# Patient Record
Sex: FEMALE | Race: BLACK OR AFRICAN AMERICAN | NOT HISPANIC OR LATINO | URBAN - METROPOLITAN AREA
[De-identification: names, ages, dates, MRNs, and addresses within clinical notes are randomized per-mention and may not be internally consistent; named-entity substitution may affect disease eponyms.]

---

## 2022-08-11 ENCOUNTER — EMERGENCY (EMERGENCY)
Facility: HOSPITAL | Age: 37
LOS: 1 days | Discharge: HOME | End: 2022-08-11
Attending: EMERGENCY MEDICINE | Admitting: EMERGENCY MEDICINE

## 2022-08-11 ENCOUNTER — TRANSCRIPTION ENCOUNTER (OUTPATIENT)
Age: 37
End: 2022-08-11

## 2022-08-11 VITALS
TEMPERATURE: 98 F | SYSTOLIC BLOOD PRESSURE: 125 MMHG | RESPIRATION RATE: 18 BRPM | DIASTOLIC BLOOD PRESSURE: 73 MMHG | HEART RATE: 98 BPM | OXYGEN SATURATION: 99 %

## 2022-08-11 PROCEDURE — 99285 EMERGENCY DEPT VISIT HI MDM: CPT

## 2022-08-11 NOTE — ED ADULT NURSE NOTE - NSIMPLEMENTINTERV_GEN_ALL_ED
Implemented All Universal Safety Interventions:  [place] to call system. Call bell, personal items and telephone within reach. Instruct patient to call for assistance. Room bathroom lighting operational. Non-slip footwear when patient is off stretcher. Physically safe environment: no spills, clutter or unnecessary equipment. Stretcher in lowest position, wheels locked, appropriate side rails in place.

## 2022-08-11 NOTE — ED ADULT NURSE NOTE - NS ED NURSE NOTE DISPO AOU DETAILS FT
Pt DC from ED. Brought to L&D via wheelchair by PCA Warren. Pt A&Ox4, ambulatory. Breathing even and unlabored. NAD

## 2022-08-11 NOTE — ED ADULT NURSE NOTE - OBJECTIVE STATEMENT
Jacqueline RN. Patient A&Ox4 ambulatory c/o lower abdominal pressure, b/l flank pain x1 day. Also endorsing urinary frequency x2 weeks. Today started having vaginal bleeding, unsure of LMP due to hx PCOS. Patient uncomfortable. Respirations even and unlabored. Abdomen firm, distended and tender on palpation to suprapubic region. Patient denies cp, sob, n/v/d, fevers/chills. 20G IV placed to left antecubital. Awaiting further orders at this time. Stretcher in lowest position, wheels locked, appropriate side rails in place, call bell in reach.

## 2022-08-11 NOTE — ED ADULT TRIAGE NOTE - CHIEF COMPLAINT QUOTE
Pt arrives with 2 weeks of urinary frequency, now today with bladder pressure, back pain and scant hematuria. Tearful and moaning in pain. Denies fevers, chills, dizziness, blood thinners. PMHx HTN.

## 2022-08-12 ENCOUNTER — RESULT REVIEW (OUTPATIENT)
Age: 37
End: 2022-08-12

## 2022-08-12 ENCOUNTER — APPOINTMENT (OUTPATIENT)
Dept: ANTEPARTUM | Facility: CLINIC | Age: 37
End: 2022-08-12

## 2022-08-12 ENCOUNTER — INPATIENT (INPATIENT)
Facility: HOSPITAL | Age: 37
LOS: 3 days | Discharge: HOME | End: 2022-08-16
Attending: SPECIALIST | Admitting: SPECIALIST

## 2022-08-12 VITALS
SYSTOLIC BLOOD PRESSURE: 111 MMHG | DIASTOLIC BLOOD PRESSURE: 74 MMHG | RESPIRATION RATE: 16 BRPM | OXYGEN SATURATION: 100 % | HEART RATE: 92 BPM | TEMPERATURE: 99 F

## 2022-08-12 VITALS — TEMPERATURE: 98 F | SYSTOLIC BLOOD PRESSURE: 117 MMHG | DIASTOLIC BLOOD PRESSURE: 75 MMHG | HEART RATE: 103 BPM

## 2022-08-12 DIAGNOSIS — O60.00 PRETERM LABOR WITHOUT DELIVERY, UNSPECIFIED TRIMESTER: ICD-10-CM

## 2022-08-12 DIAGNOSIS — O26.899 OTHER SPECIFIED PREGNANCY RELATED CONDITIONS, UNSPECIFIED TRIMESTER: ICD-10-CM

## 2022-08-12 DIAGNOSIS — Z3A.00 WEEKS OF GESTATION OF PREGNANCY NOT SPECIFIED: ICD-10-CM

## 2022-08-12 LAB
AMPHET UR-MCNC: POSITIVE
ANION GAP SERPL CALC-SCNC: 15 MMOL/L — HIGH (ref 7–14)
APPEARANCE UR: ABNORMAL
APTT BLD: 27.6 SEC — SIGNIFICANT CHANGE UP (ref 27–36.3)
BACTERIA # UR AUTO: ABNORMAL
BARBITURATES UR SCN-MCNC: NEGATIVE — SIGNIFICANT CHANGE UP
BASOPHILS # BLD AUTO: 0.03 K/UL — SIGNIFICANT CHANGE UP (ref 0–0.2)
BASOPHILS NFR BLD AUTO: 0.2 % — SIGNIFICANT CHANGE UP (ref 0–2)
BENZODIAZ UR-MCNC: NEGATIVE — SIGNIFICANT CHANGE UP
BILIRUB UR-MCNC: ABNORMAL
BLD GP AB SCN SERPL QL: NEGATIVE — SIGNIFICANT CHANGE UP
BUN SERPL-MCNC: 5 MG/DL — LOW (ref 7–23)
CALCIUM SERPL-MCNC: 9.3 MG/DL — SIGNIFICANT CHANGE UP (ref 8.4–10.5)
CHLORIDE SERPL-SCNC: 101 MMOL/L — SIGNIFICANT CHANGE UP (ref 98–107)
CO2 SERPL-SCNC: 19 MMOL/L — LOW (ref 22–31)
COCAINE METAB.OTHER UR-MCNC: NEGATIVE — SIGNIFICANT CHANGE UP
COLOR SPEC: ABNORMAL
COVID-19 SPIKE DOMAIN AB INTERP: POSITIVE
COVID-19 SPIKE DOMAIN ANTIBODY RESULT: 12.2 U/ML — HIGH
CREAT SERPL-MCNC: 0.6 MG/DL — SIGNIFICANT CHANGE UP (ref 0.5–1.3)
CREATININE URINE RESULT, DAU: 253 MG/DL — SIGNIFICANT CHANGE UP
DIFF PNL FLD: ABNORMAL
EGFR: 118 ML/MIN/1.73M2 — SIGNIFICANT CHANGE UP
EOSINOPHIL # BLD AUTO: 0.07 K/UL — SIGNIFICANT CHANGE UP (ref 0–0.5)
EOSINOPHIL NFR BLD AUTO: 0.4 % — SIGNIFICANT CHANGE UP (ref 0–6)
EPI CELLS # UR: 1 /HPF — SIGNIFICANT CHANGE UP (ref 0–5)
FIBRINOGEN PPP-MCNC: 793 MG/DL — HIGH (ref 330–520)
GLUCOSE SERPL-MCNC: 86 MG/DL — SIGNIFICANT CHANGE UP (ref 70–99)
GLUCOSE UR QL: NEGATIVE — SIGNIFICANT CHANGE UP
HBV SURFACE AG SERPL QL IA: SIGNIFICANT CHANGE UP
HCG SERPL-ACNC: 4423 MIU/ML — SIGNIFICANT CHANGE UP
HCT VFR BLD CALC: 28.9 % — LOW (ref 34.5–45)
HGB BLD-MCNC: 9.3 G/DL — LOW (ref 11.5–15.5)
HIV 1+2 AB+HIV1 P24 AG SERPL QL IA: SIGNIFICANT CHANGE UP
HYALINE CASTS # UR AUTO: 0 /LPF — SIGNIFICANT CHANGE UP (ref 0–7)
IANC: 14.24 K/UL — HIGH (ref 1.8–7.4)
IMM GRANULOCYTES NFR BLD AUTO: 0.5 % — SIGNIFICANT CHANGE UP (ref 0–1.5)
KETONES UR-MCNC: ABNORMAL
LEUKOCYTE ESTERASE UR-ACNC: ABNORMAL
LYMPHOCYTES # BLD AUTO: 1.4 K/UL — SIGNIFICANT CHANGE UP (ref 1–3.3)
LYMPHOCYTES # BLD AUTO: 8.3 % — LOW (ref 13–44)
MAGNESIUM SERPL-MCNC: 1.7 MG/DL — SIGNIFICANT CHANGE UP (ref 1.6–2.6)
MAGNESIUM SERPL-MCNC: 4 MG/DL — HIGH (ref 1.6–2.6)
MCHC RBC-ENTMCNC: 25.8 PG — LOW (ref 27–34)
MCHC RBC-ENTMCNC: 32.2 GM/DL — SIGNIFICANT CHANGE UP (ref 32–36)
MCV RBC AUTO: 80.1 FL — SIGNIFICANT CHANGE UP (ref 80–100)
METHADONE UR-MCNC: NEGATIVE — SIGNIFICANT CHANGE UP
MONOCYTES # BLD AUTO: 1 K/UL — HIGH (ref 0–0.9)
MONOCYTES NFR BLD AUTO: 5.9 % — SIGNIFICANT CHANGE UP (ref 2–14)
NEUTROPHILS # BLD AUTO: 14.24 K/UL — HIGH (ref 1.8–7.4)
NEUTROPHILS NFR BLD AUTO: 84.7 % — HIGH (ref 43–77)
NITRITE UR-MCNC: POSITIVE
NRBC # BLD: 0 /100 WBCS — SIGNIFICANT CHANGE UP
NRBC # FLD: 0 K/UL — SIGNIFICANT CHANGE UP
OPIATES UR-MCNC: NEGATIVE — SIGNIFICANT CHANGE UP
OXYCODONE UR-MCNC: NEGATIVE — SIGNIFICANT CHANGE UP
PCP SPEC-MCNC: SIGNIFICANT CHANGE UP
PCP UR-MCNC: NEGATIVE — SIGNIFICANT CHANGE UP
PH UR: 6 — SIGNIFICANT CHANGE UP (ref 5–8)
PHOSPHATE SERPL-MCNC: 2.9 MG/DL — SIGNIFICANT CHANGE UP (ref 2.5–4.5)
PLATELET # BLD AUTO: 323 K/UL — SIGNIFICANT CHANGE UP (ref 150–400)
POTASSIUM SERPL-MCNC: 3.4 MMOL/L — LOW (ref 3.5–5.3)
POTASSIUM SERPL-SCNC: 3.4 MMOL/L — LOW (ref 3.5–5.3)
PROT UR-MCNC: ABNORMAL
RBC # BLD: 3.61 M/UL — LOW (ref 3.8–5.2)
RBC # FLD: 14.7 % — HIGH (ref 10.3–14.5)
RBC CASTS # UR COMP ASSIST: 267 /HPF — HIGH (ref 0–4)
RH IG SCN BLD-IMP: POSITIVE — SIGNIFICANT CHANGE UP
RH IG SCN BLD-IMP: POSITIVE — SIGNIFICANT CHANGE UP
SARS-COV-2 IGG+IGM SERPL QL IA: 12.2 U/ML — HIGH
SARS-COV-2 IGG+IGM SERPL QL IA: POSITIVE
SARS-COV-2 RNA SPEC QL NAA+PROBE: SIGNIFICANT CHANGE UP
SODIUM SERPL-SCNC: 135 MMOL/L — SIGNIFICANT CHANGE UP (ref 135–145)
SP GR SPEC: 1.02 — SIGNIFICANT CHANGE UP (ref 1–1.05)
THC UR QL: NEGATIVE — SIGNIFICANT CHANGE UP
UROBILINOGEN FLD QL: ABNORMAL
WBC # BLD: 16.82 K/UL — HIGH (ref 3.8–10.5)
WBC # FLD AUTO: 16.82 K/UL — HIGH (ref 3.8–10.5)
WBC UR QL: 129 /HPF — HIGH (ref 0–5)

## 2022-08-12 PROCEDURE — 59514 CESAREAN DELIVERY ONLY: CPT | Mod: U7,UB,GC

## 2022-08-12 PROCEDURE — 88307 TISSUE EXAM BY PATHOLOGIST: CPT | Mod: 26

## 2022-08-12 PROCEDURE — 99222 1ST HOSP IP/OBS MODERATE 55: CPT

## 2022-08-12 PROCEDURE — 76817 TRANSVAGINAL US OBSTETRIC: CPT | Mod: 26

## 2022-08-12 DEVICE — INTERCEED 3 X 4": Type: IMPLANTABLE DEVICE | Status: FUNCTIONAL

## 2022-08-12 DEVICE — SURGICEL SNOW 2 X 4": Type: IMPLANTABLE DEVICE | Status: FUNCTIONAL

## 2022-08-12 RX ORDER — OXYCODONE HYDROCHLORIDE 5 MG/1
5 TABLET ORAL ONCE
Refills: 0 | Status: DISCONTINUED | OUTPATIENT
Start: 2022-08-12 | End: 2022-08-16

## 2022-08-12 RX ORDER — AMPICILLIN TRIHYDRATE 250 MG
2 CAPSULE ORAL ONCE
Refills: 0 | Status: COMPLETED | OUTPATIENT
Start: 2022-08-12 | End: 2022-08-12

## 2022-08-12 RX ORDER — MAGNESIUM SULFATE 500 MG/ML
2 VIAL (ML) INJECTION
Qty: 40 | Refills: 0 | Status: DISCONTINUED | OUTPATIENT
Start: 2022-08-12 | End: 2022-08-12

## 2022-08-12 RX ORDER — OXYTOCIN 10 UNIT/ML
333.33 VIAL (ML) INJECTION
Qty: 20 | Refills: 0 | Status: DISCONTINUED | OUTPATIENT
Start: 2022-08-12 | End: 2022-08-13

## 2022-08-12 RX ORDER — BUTORPHANOL TARTRATE 2 MG/ML
0.12 INJECTION, SOLUTION INTRAMUSCULAR; INTRAVENOUS EVERY 6 HOURS
Refills: 0 | Status: DISCONTINUED | OUTPATIENT
Start: 2022-08-12 | End: 2022-08-12

## 2022-08-12 RX ORDER — MAGNESIUM SULFATE 500 MG/ML
4 VIAL (ML) INJECTION ONCE
Refills: 0 | Status: COMPLETED | OUTPATIENT
Start: 2022-08-12 | End: 2022-08-12

## 2022-08-12 RX ORDER — SODIUM CHLORIDE 9 MG/ML
1000 INJECTION, SOLUTION INTRAVENOUS
Refills: 0 | Status: DISCONTINUED | OUTPATIENT
Start: 2022-08-12 | End: 2022-08-16

## 2022-08-12 RX ORDER — SODIUM CHLORIDE 9 MG/ML
500 INJECTION, SOLUTION INTRAVENOUS ONCE
Refills: 0 | Status: DISCONTINUED | OUTPATIENT
Start: 2022-08-12 | End: 2022-08-12

## 2022-08-12 RX ORDER — AMPICILLIN TRIHYDRATE 250 MG
CAPSULE ORAL
Refills: 0 | Status: DISCONTINUED | OUTPATIENT
Start: 2022-08-12 | End: 2022-08-12

## 2022-08-12 RX ORDER — LANOLIN
1 OINTMENT (GRAM) TOPICAL EVERY 6 HOURS
Refills: 0 | Status: DISCONTINUED | OUTPATIENT
Start: 2022-08-12 | End: 2022-08-16

## 2022-08-12 RX ORDER — ONDANSETRON 8 MG/1
4 TABLET, FILM COATED ORAL EVERY 6 HOURS
Refills: 0 | Status: DISCONTINUED | OUTPATIENT
Start: 2022-08-12 | End: 2022-08-16

## 2022-08-12 RX ORDER — MAGNESIUM SULFATE 500 MG/ML
4 VIAL (ML) INJECTION ONCE
Refills: 0 | Status: DISCONTINUED | OUTPATIENT
Start: 2022-08-12 | End: 2022-08-12

## 2022-08-12 RX ORDER — SODIUM CHLORIDE 9 MG/ML
1000 INJECTION, SOLUTION INTRAVENOUS ONCE
Refills: 0 | Status: DISCONTINUED | OUTPATIENT
Start: 2022-08-12 | End: 2022-08-12

## 2022-08-12 RX ORDER — ACETAMINOPHEN 500 MG
650 TABLET ORAL ONCE
Refills: 0 | Status: COMPLETED | OUTPATIENT
Start: 2022-08-12 | End: 2022-08-12

## 2022-08-12 RX ORDER — CEFTRIAXONE 500 MG/1
INJECTION, POWDER, FOR SOLUTION INTRAMUSCULAR; INTRAVENOUS
Refills: 0 | Status: DISCONTINUED | OUTPATIENT
Start: 2022-08-12 | End: 2022-08-12

## 2022-08-12 RX ORDER — SIMETHICONE 80 MG/1
80 TABLET, CHEWABLE ORAL EVERY 4 HOURS
Refills: 0 | Status: DISCONTINUED | OUTPATIENT
Start: 2022-08-12 | End: 2022-08-16

## 2022-08-12 RX ORDER — ERTAPENEM SODIUM 1 G/1
1000 INJECTION, POWDER, LYOPHILIZED, FOR SOLUTION INTRAMUSCULAR; INTRAVENOUS ONCE
Refills: 0 | Status: COMPLETED | OUTPATIENT
Start: 2022-08-12 | End: 2022-08-12

## 2022-08-12 RX ORDER — KETOROLAC TROMETHAMINE 30 MG/ML
15 SYRINGE (ML) INJECTION ONCE
Refills: 0 | Status: DISCONTINUED | OUTPATIENT
Start: 2022-08-12 | End: 2022-08-12

## 2022-08-12 RX ORDER — NIFEDIPINE 30 MG
30 TABLET, EXTENDED RELEASE 24 HR ORAL ONCE
Refills: 0 | Status: COMPLETED | OUTPATIENT
Start: 2022-08-12 | End: 2022-08-12

## 2022-08-12 RX ORDER — CEFTRIAXONE 500 MG/1
1000 INJECTION, POWDER, FOR SOLUTION INTRAMUSCULAR; INTRAVENOUS ONCE
Refills: 0 | Status: COMPLETED | OUTPATIENT
Start: 2022-08-12 | End: 2022-08-12

## 2022-08-12 RX ORDER — IBUPROFEN 200 MG
600 TABLET ORAL EVERY 6 HOURS
Refills: 0 | Status: COMPLETED | OUTPATIENT
Start: 2022-08-12 | End: 2023-07-11

## 2022-08-12 RX ORDER — KETOROLAC TROMETHAMINE 30 MG/ML
30 SYRINGE (ML) INJECTION EVERY 6 HOURS
Refills: 0 | Status: DISCONTINUED | OUTPATIENT
Start: 2022-08-12 | End: 2022-08-13

## 2022-08-12 RX ORDER — MAGNESIUM SULFATE 500 MG/ML
2 VIAL (ML) INJECTION
Qty: 40 | Refills: 0 | Status: DISCONTINUED | OUTPATIENT
Start: 2022-08-12 | End: 2022-08-16

## 2022-08-12 RX ORDER — DEXAMETHASONE 0.5 MG/5ML
4 ELIXIR ORAL EVERY 6 HOURS
Refills: 0 | Status: DISCONTINUED | OUTPATIENT
Start: 2022-08-12 | End: 2022-08-16

## 2022-08-12 RX ORDER — TETANUS TOXOID, REDUCED DIPHTHERIA TOXOID AND ACELLULAR PERTUSSIS VACCINE, ADSORBED 5; 2.5; 8; 8; 2.5 [IU]/.5ML; [IU]/.5ML; UG/.5ML; UG/.5ML; UG/.5ML
0.5 SUSPENSION INTRAMUSCULAR ONCE
Refills: 0 | Status: COMPLETED | OUTPATIENT
Start: 2022-08-12

## 2022-08-12 RX ORDER — NALOXONE HYDROCHLORIDE 4 MG/.1ML
0.1 SPRAY NASAL
Refills: 0 | Status: DISCONTINUED | OUTPATIENT
Start: 2022-08-12 | End: 2022-08-16

## 2022-08-12 RX ORDER — SODIUM CHLORIDE 9 MG/ML
1000 INJECTION, SOLUTION INTRAVENOUS
Refills: 0 | Status: DISCONTINUED | OUTPATIENT
Start: 2022-08-12 | End: 2022-08-12

## 2022-08-12 RX ORDER — NIFEDIPINE 30 MG
10 TABLET, EXTENDED RELEASE 24 HR ORAL ONCE
Refills: 0 | Status: COMPLETED | OUTPATIENT
Start: 2022-08-12 | End: 2022-08-12

## 2022-08-12 RX ORDER — DIPHENHYDRAMINE HCL 50 MG
25 CAPSULE ORAL EVERY 6 HOURS
Refills: 0 | Status: DISCONTINUED | OUTPATIENT
Start: 2022-08-12 | End: 2022-08-16

## 2022-08-12 RX ORDER — ACETAMINOPHEN 500 MG
975 TABLET ORAL
Refills: 0 | Status: DISCONTINUED | OUTPATIENT
Start: 2022-08-12 | End: 2022-08-16

## 2022-08-12 RX ORDER — HEPARIN SODIUM 5000 [USP'U]/ML
5000 INJECTION INTRAVENOUS; SUBCUTANEOUS EVERY 12 HOURS
Refills: 0 | Status: DISCONTINUED | OUTPATIENT
Start: 2022-08-12 | End: 2022-08-16

## 2022-08-12 RX ORDER — MAGNESIUM HYDROXIDE 400 MG/1
30 TABLET, CHEWABLE ORAL
Refills: 0 | Status: DISCONTINUED | OUTPATIENT
Start: 2022-08-12 | End: 2022-08-16

## 2022-08-12 RX ORDER — LABETALOL HCL 100 MG
100 TABLET ORAL
Refills: 0 | Status: DISCONTINUED | OUTPATIENT
Start: 2022-08-12 | End: 2022-08-15

## 2022-08-12 RX ORDER — MORPHINE SULFATE 50 MG/1
0.1 CAPSULE, EXTENDED RELEASE ORAL ONCE
Refills: 0 | Status: DISCONTINUED | OUTPATIENT
Start: 2022-08-12 | End: 2022-08-16

## 2022-08-12 RX ORDER — OXYCODONE HYDROCHLORIDE 5 MG/1
5 TABLET ORAL
Refills: 0 | Status: DISCONTINUED | OUTPATIENT
Start: 2022-08-12 | End: 2022-08-16

## 2022-08-12 RX ORDER — AMPICILLIN TRIHYDRATE 250 MG
1 CAPSULE ORAL EVERY 4 HOURS
Refills: 0 | Status: DISCONTINUED | OUTPATIENT
Start: 2022-08-12 | End: 2022-08-12

## 2022-08-12 RX ORDER — CEFTRIAXONE 500 MG/1
1000 INJECTION, POWDER, FOR SOLUTION INTRAMUSCULAR; INTRAVENOUS EVERY 24 HOURS
Refills: 0 | Status: CANCELLED | OUTPATIENT
Start: 2022-08-13 | End: 2022-08-12

## 2022-08-12 RX ADMIN — Medication 10 MILLIGRAM(S): at 05:58

## 2022-08-12 RX ADMIN — Medication 108 GRAM(S): at 14:00

## 2022-08-12 RX ADMIN — ERTAPENEM SODIUM 120 MILLIGRAM(S): 1 INJECTION, POWDER, LYOPHILIZED, FOR SOLUTION INTRAMUSCULAR; INTRAVENOUS at 19:36

## 2022-08-12 RX ADMIN — Medication 300 GRAM(S): at 05:43

## 2022-08-12 RX ADMIN — ONDANSETRON 4 MILLIGRAM(S): 8 TABLET, FILM COATED ORAL at 21:56

## 2022-08-12 RX ADMIN — Medication 30 MILLIGRAM(S): at 05:58

## 2022-08-12 RX ADMIN — Medication 1000 MILLIUNIT(S)/MIN: at 19:28

## 2022-08-12 RX ADMIN — CEFTRIAXONE 100 MILLIGRAM(S): 500 INJECTION, POWDER, FOR SOLUTION INTRAMUSCULAR; INTRAVENOUS at 06:11

## 2022-08-12 RX ADMIN — HEPARIN SODIUM 5000 UNIT(S): 5000 INJECTION INTRAVENOUS; SUBCUTANEOUS at 23:33

## 2022-08-12 RX ADMIN — Medication 650 MILLIGRAM(S): at 01:07

## 2022-08-12 RX ADMIN — Medication 12 MILLIGRAM(S): at 05:34

## 2022-08-12 RX ADMIN — Medication 50 GM/HR: at 06:03

## 2022-08-12 RX ADMIN — Medication 216 GRAM(S): at 05:53

## 2022-08-12 RX ADMIN — Medication 108 GRAM(S): at 10:03

## 2022-08-12 NOTE — OB PROVIDER LABOR PROGRESS NOTE - NS_SUBJECTIVE/OBJECTIVE_OBGYN_ALL_OB_FT
Patient was assessed at bedside.  Patient stated she started having contraction pain every 3 minutes for th elast hour; denies leaking fluid/vaginal bleeding, reports + fetal movements.   Magnesium sulfate is in progress for neuroprotection  Procardia XL 30 for chronic HTN/tocolysis   Betamethasone 1st dose administered at 5 am  Rocephin is for UTI  Ampicillin for unknown GBS, cultures are pending  Patient is NPO secondary to breech presentation and advanced cervical exam , was advised to continue p  Patient was assessed by MFM, during rounds (unable to review the report at this time)  Vital Signs:  T(C): 37.2 (12 Aug 2022 14:01), Max: 37.2 (12 Aug 2022 14:01)  T(F): 98.96 (12 Aug 2022 14:01), Max: 98.96 (12 Aug 2022 14:01)  HR: 109 (12 Aug 2022 16:02) (92 - 150)  BP: 125/66 (12 Aug 2022 16:02) (99/59 - 139/57)  RR: 19 (12 Aug 2022 06:20) (16 - 19)  SpO2: 100% (12 Aug 2022 16:02) (96% - 100%)    O2 Parameters below as of 12 Aug 2022 04:41  Patient On (Oxygen Delivery Method): room air                          9.3    16.82 )-----------( 323      ( 12 Aug 2022 00:40 )             28.9   08-12    135  |  101  |  5<L>  ----------------------------<  86  3.4<L>   |  19<L>  |  0.60    Ca    9.3      12 Aug 2022 00:40  Phos  2.9     08-12  Mg     4.00     08-12    Urinalysis Basic - ( 12 Aug 2022 00:40 )    Color: Dark Orange / Appearance: Slightly Turbid / S.022 / pH: x  Gluc: x / Ketone: Large  / Bili: Small / Urobili: 6 mg/dL   Blood: x / Protein: Trace / Nitrite: Positive   Leuk Esterase: Large / RBC: 267 /HPF /  /HPF   Sq Epi: x / Non Sq Epi: 1 /HPF / Bacteria: Occasional Patient was assessed at bedside du eto nonreactive NST.  Patient stated she started having contraction pain every 3 minutes for the last hour; denies leaking fluid/vaginal bleeding, reports + fetal movements.   Magnesium sulfate is in progress for neuroprotection  Procardia XL 30 for chronic HTN/tocolysis   Betamethasone 1st dose administered at 5 am  Rocephin is for UTI  Ampicillin for unknown GBS, cultures are pending  Patient is NPO secondary to breech presentation and advanced cervical exam   Patient was assessed by MFM, during rounds (unable to review the report at this time)  Vital Signs:  T(C): 37.2 (12 Aug 2022 14:01), Max: 37.2 (12 Aug 2022 14:01)  T(F): 98.96 (12 Aug 2022 14:01), Max: 98.96 (12 Aug 2022 14:01)  HR: 109 (12 Aug 2022 16:02) (92 - 150)  BP: 125/66 (12 Aug 2022 16:02) (99/59 - 139/57)  RR: 19 (12 Aug 2022 06:20) (16 - 19)  SpO2: 100% (12 Aug 2022 16:02) (96% - 100%)    O2 Parameters below as of 12 Aug 2022 04:41  Patient On (Oxygen Delivery Method): room air                          9.3    16.82 )-----------( 323      ( 12 Aug 2022 00:40 )             28.9   08-12    135  |  101  |  5<L>  ----------------------------<  86  3.4<L>   |  19<L>  |  0.60    Ca    9.3      12 Aug 2022 00:40  Phos  2.9     08-12  Mg     4.00     08-12    Urinalysis Basic - ( 12 Aug 2022 00:40 )    Color: Dark Orange / Appearance: Slightly Turbid / S.022 / pH: x  Gluc: x / Ketone: Large  / Bili: Small / Urobili: 6 mg/dL   Blood: x / Protein: Trace / Nitrite: Positive   Leuk Esterase: Large / RBC: 267 /HPF /  /HPF   Sq Epi: x / Non Sq Epi: 1 /HPF / Bacteria: Occasional

## 2022-08-12 NOTE — CONSULT NOTE ADULT - ASSESSMENT
35 yo  at 26w2d with newly diagnosed pregnancy, UTI admitted for  labor with double footling breech presentation.     Recommendations:   -Given footling breech presentation, if progressing in  labor patient would require delivery via . This was discussed with her and patient amenable to this plan. Patient consented for  delivery with the high chance of classical  delivery discussed.   -Tocolysis stopped this morning due to concerns for possible placental abruption and/or infection as etiology of  labor   -Patient received one dose of betamethasone for fetal lung maturity and was evaluated by NICU team   -Continue magnesium for fetal neuroprotection for 12 hours   -Recommend continued treatment for UTI   -On initial evaluation the patient's UDS resulted positive for amphetamine, she denies any h/o drug use or dietary supplements (other than "vinegar pills"). Unclear why this was initially collected as patient denies history of drug use and she exhibits no signs clinically of intoxication. Recommend repeat UDS   -Patient has support person who will be covering if she needs to proceed with delivery. Emotional support provided to patient and her questions were answered   -Recommend continuation of patient's antihypertensive medication. At this time patient is normotensive without signs/symptoms of preeclampsia   -Dispo: patient to remain in PACU given high likelihood of  labor/delivery for observation, anticipate will likely deliver in next 1-2 days     Patient seen and d/w Dr. Rose Mary Wallace Comfort PGY-5  35 yo  at 26w2d with newly diagnosed pregnancy, UTI admitted for  labor with double footling breech presentation.     Recommendations:   -Given footling breech presentation, if progressing in  labor patient would require delivery via . This was discussed with her and patient amenable to this plan. Patient consented for  delivery with the high chance of classical  delivery discussed.   -Tocolysis stopped this morning due to concerns for possible placental abruption and/or infection as etiology of  labor   -Patient received one dose of betamethasone for fetal lung maturity and was evaluated by NICU team   -Continue magnesium for fetal neuroprotection for 12 hours   -Recommend continued treatment for UTI   -On initial evaluation the patient's UDS resulted positive for amphetamine, she denies any h/o drug use or dietary supplements (other than "vinegar pills"). Unclear why this was initially collected as patient denies history of drug use and she exhibits no signs clinically of intoxication. Of note, labetalol can rarely cause falsely elevated amphetamine on UDS. Recommend repeat UDS   -Patient has support person who will be covering if she needs to proceed with delivery. Emotional support provided to patient and her questions were answered   -Recommend continuation of patient's antihypertensive medication. At this time patient is normotensive without signs/symptoms of preeclampsia   -Dispo: patient to remain in PACU given high likelihood of  labor/delivery for observation, anticipate will likely deliver in next 1-2 days     Patient seen and d/w Dr. Rose Mary Wallace Comfort PGY-5

## 2022-08-12 NOTE — OB RN INTRAOPERATIVE NOTE - NSSELHIDDEN_OBGYN_ALL_OB_FT
[NS_DeliveryAttending1_OBGYN_ALL_OB_FT:[id number]],[NS_DeliveryAssist1_OBGYN_ALL_OB_FT:[id number]],[NS_DeliveryRN_OBGYN_ALL_OB_FT:[id number]] [NS_DeliveryAttending1_OBGYN_ALL_OB_FT:[id number]],[NS_DeliveryAssist1_OBGYN_ALL_OB_FT:[id number]],[NS_DeliveryRN_OBGYN_ALL_OB_FT:[id number]],[NS_DeliveryAttending2_OBGYN_ALL_OB_FT:[id number]],[NS_DeliveryAssist2_OBGYN_ALL_OB_FT:[id number]]

## 2022-08-12 NOTE — CONSULT NOTE ADULT - SUBJECTIVE AND OBJECTIVE BOX
MFM Initial Consult Note    **incomplete note**    HPI: The patient is a 37 yo  at ____     History:   OB: G1, h/o infertility  Gyn: irreg menses, +h/o PCOS, -f/c, +remote h/o chlamydia (treated), +h/o abnl Paps s/p procedure (not excisional procedure, possible D&C?)   PMH: PCOS, class II obesity, chronic hypertension  M Initial Consult Note    **incomplete note**     HPI: The patient is a 35 yo  at 26w2d dated by bedside ultrasound (unknown LMP, patient thinks possibly sometime in January, patient did not previously know she was pregnant) who was found to be 5cm dilated after presenting for one day of abdominal pain. Patient reports two weeks of mild abdominal discomfort/pressure with urination, and yesterday noted the onset of abdominal pain followed by vaginal bleeding causing her to present to the ED. She denies fevers, chills, flank pain, abdominal trauma. Her only other symptoms in the preceding months were nausea/vomiting, which she attributed to enteritis, and she noted her stomach was growing larger which she attributed to weight gain. She was previously trying to become pregnant but had difficulty and subsequently stopped trying. At time of [facility] evaluation this morning, she reports lower abdominal pressure but denied contractions, vaginal bleeding, leaking fluid.     The patient's ***     History:   OB: G1, h/o infertility  Gyn: irreg menses, +h/o PCOS, -f/c, +remote h/o chlamydia (treated), +h/o abnl Paps s/p procedure (not excisional procedure, possible D&C?)   PMH: PCOS, class II obesity (BMI 37), chronic hypertension, h/o peptic ulcer disease   PSH: EGD for peptic ulcer disease   Meds: labetalol 100mg bid   NKDA   SH: Denies smoking, drinking, other drug use     ATU     Recommendations:   -Given footling breech presentation, if progressing in  labor patient would require delivery via . This was discussed with her and patient amenable to this plan. Patient consented for  delivery with the high chance of classical  delivery discussed.   -Tocolysis stopped this morning due to concerns for possible placental abruption and/or infection as etiology of  labor   -Patient received one dose of betamethasone for fetal lung maturity and was evaluated by NICU team   -Recommend continued treatment for UTI   -Patient has support person who will be covering if she needs to proceed with delivery. Emotional support provided to patient and her questions were answered     Patient seen and d/w Dr. Rose Mayr Burciaga PGY-5  MFM Initial Consult Note    HPI: The patient is a 35 yo  at 26w2d dated by bedside ultrasound (unknown LMP, patient thinks possibly sometime in January, patient did not previously know she was pregnant) who was found to be 5cm dilated after presenting for one day of abdominal pain. Patient reports two weeks of mild abdominal discomfort/pressure with urination, and yesterday noted the onset of abdominal pain followed by vaginal bleeding causing her to present to the ED. She denies fevers, chills, flank pain, abdominal trauma. Her only other symptoms in the preceding months were nausea/vomiting, which she attributed to enteritis, and she noted her stomach was growing larger which she attributed to weight gain. She was previously trying to become pregnant but had difficulty and subsequently stopped trying. At time of [facility] evaluation this morning, she reports lower abdominal pressure but denied contractions, vaginal bleeding, leaking fluid.     History:   OB: G1, h/o infertility  Gyn: irreg menses, +h/o PCOS, -f/c, +remote h/o chlamydia (treated), +h/o abnl Paps s/p procedure (not excisional procedure, possible D&C?)   PMH: PCOS, class II obesity (BMI 37), chronic hypertension, h/o peptic ulcer disease   PSH: EGD for peptic ulcer disease   Meds: labetalol 100mg bid   NKDA   SH: Denies smoking, drinking, other drug use     ATU scan ***     Recommendations:   -Given footling breech presentation, if progressing in  labor patient would require delivery via . This was discussed with her and patient amenable to this plan. Patient consented for  delivery with the high chance of classical  delivery discussed.   -Tocolysis stopped this morning due to concerns for possible placental abruption and/or infection as etiology of  labor   -Patient received one dose of betamethasone for fetal lung maturity and was evaluated by NICU team   -Recommend continued treatment for UTI   -On initial evaluation the patient's   -Patient has support person who will be covering if she needs to proceed with delivery. Emotional support provided to patient and her questions were answered     Patient seen and d/w Dr. Rose Mary Wallace Comfort PGY-5  MFM Initial Consult Note    HPI: The patient is a 35 yo  at 26w2d dated by bedside ultrasound (unknown LMP, patient thinks possibly sometime in January, patient did not previously know she was pregnant) who was found to be 5cm dilated after presenting for one day of abdominal pain. Patient reports two weeks of mild abdominal discomfort/pressure with urination, and yesterday noted the onset of abdominal pain followed by vaginal bleeding causing her to present to the ED. She denies fevers, chills, flank pain, abdominal trauma. Her only other symptoms in the preceding months were nausea/vomiting, which she attributed to enteritis, and she noted her stomach was growing larger which she attributed to weight gain. She was previously trying to become pregnant but had difficulty and subsequently stopped trying. At time of MFM evaluation this morning, she reports lower abdominal pressure but denied contractions, vaginal bleeding, leaking fluid. She further denies headaches, vision changes, RUQ/epigastric pain.     History:   OB: G1, h/o infertility  Gyn: irreg menses, +h/o PCOS, -f/c, +remote h/o chlamydia (treated), +h/o abnl Paps s/p procedure (not excisional procedure, possible D&C?)   PMH: PCOS, class II obesity (BMI 37), chronic hypertension, h/o peptic ulcer disease   PSH: EGD for peptic ulcer disease   Meds: labetalol 100mg bid   NKDA   SH: Denies smoking, drinking, other drug use     ATU scan: double footling breech, placenta anterior, MVP 5cm, EFW 1021g   Normal anatomy scan although limited given late gestational age/fetal position    Exam:   VS Afebrile, normotensive, HR low 100s  Gen: comfortable appearing at time of MFM evaluation  Abd: No fundal tenderness, fundus palpable above umbilicus   : No CVA tenderness b/l   SVE (resident team): 5cm dilated with bulging membranes     Labs:   WBC 16.8 (following BMZ)   UA positive nitrites/leuk esterase      MFM Initial Consult Note    HPI: The patient is a 37 yo  at 26w2d dated by bedside ultrasound (unknown LMP, patient thinks possibly sometime in January, patient did not previously know she was pregnant) who was found to be 5cm dilated after presenting for one day of abdominal pain. Patient reports two weeks of mild abdominal discomfort/pressure with urination, and yesterday noted the onset of abdominal pain followed by vaginal bleeding causing her to present to the ED. She denies fevers, chills, flank pain, abdominal trauma. Her only other symptoms in the preceding months were nausea/vomiting, which she attributed to enteritis, and she noted her stomach was growing larger which she attributed to weight gain. She was previously trying to become pregnant but had difficulty and subsequently stopped trying. At time of MFM evaluation this morning, she reports lower abdominal pressure but denied contractions, vaginal bleeding, leaking fluid. She further denies headaches, vision changes, RUQ/epigastric pain.     History:   OB: G1, h/o infertility  Gyn: irreg menses, +h/o PCOS, -f/c, +remote h/o chlamydia (treated), +h/o abnl Paps s/p procedure (not excisional procedure, possible D&C?)   PMH: PCOS, class II obesity (BMI 37), chronic hypertension, h/o peptic ulcer disease   PSH: EGD for peptic ulcer disease   Meds: labetalol 100mg bid   NKDA   SH: Denies smoking, drinking, other drug use     ATU scan: double footling breech, placenta anterior, MVP 5cm, EFW 1021g   Normal anatomy scan although limited given late gestational age/fetal position    Exam:   VS Afebrile, normotensive, HR low 100s  Gen: comfortable appearing at time of MFM evaluation  Abd: No fundal tenderness, fundus palpable above umbilicus   : No CVA tenderness b/l   SVE (resident team): 5cm dilated with bulging membranes     NST reviewed, appropriate for gestational age with moderate variability, no decelerations although loss of contact noted, no contractions at time of MFM evaluation but toco was readjusted.     Labs:   WBC 16.8 (following BMZ)   UA positive nitrites/leuk esterase

## 2022-08-12 NOTE — OB PROVIDER DELIVERY SUMMARY - NSPROVIDERDELIVERYNOTE_OBGYN_ALL_OB_FT
Procedure: pCCS  Preop Dx:  labor, footling breech presentation  QBL:432cc  IVF:  2L crystalloids  UOP: 100ml  Layers of uterine closure: 2  Complications: none  Specimen: placenta   Findings: anterior placenta noted, uterine septum noted, otherwise grossly normal uterus, BL fallopian tubes, BL ovaries  Hemostatic/intraoperative agents: intercede, surgicel powder  Baby: M, footling breech    Celestino Bryan, PGY3    dictation#[id number]

## 2022-08-12 NOTE — ED PROVIDER NOTE - CLINICAL SUMMARY MEDICAL DECISION MAKING FREE TEXT BOX
35yo F with PMHX PCOS, HTN, PUD, p/w 2 weeks of urinary frequency with 1 day of suprapubic pain radiating to lower back b/l and 1 day of vaginal bleeding without clots. Pt is not on OCPs and LMP ~2months ago but menses tend to be irregular per pt.  No dysuria, n/v/d, or fevers    EXAM as above    a/p-  cystitis/uti vs ureteral stone vs fibroids  will need UCG, labs, Transvag ultrasound  tylenol

## 2022-08-12 NOTE — OB PROVIDER LABOR PROGRESS NOTE - ASSESSMENT
patient's status was reviewed with PGY3, was advised to continue NPO status; will be reevaluated by PGY3 for possible  section.    Monica Esteban CNM

## 2022-08-12 NOTE — ED PROVIDER NOTE - NSFOLLOWUPINSTRUCTIONS_ED_ALL_ED_FT
You were seen in the Emergency Room for lower abdominal pain and vaginal bleeding.    You were found to be pregnant today  Your ultrasound dates your pregnancy to 27weeks pregnant.    Please go directly to labor and delivery from the ER for further evaluation.     Please return to the Emergency Room if your symptoms change or worsen.

## 2022-08-12 NOTE — OB NEONATOLOGY/PEDIATRICIAN DELIVERY SUMMARY - NSPEDSNEONOTESA_OBGYN_ALL_OB_FT
Requested by OB to attend this emergent  delivery at ~ 27 weeks for prematurity, breech presentation. Mother is a 37 year old,  , blood type O pos.  Prenatal labs as follow: HIV neg, RPR pending, rubella unknown, HBsA neg, GBS unk, s/p ampicillin x 3. Maternal history significant for chronic HTN and PCOS, in addition to suspected pyelonephritis. Prenatal history significant for lack of prenatal care - mother unaware of pregnancy until presentation. This prenancy was complicated by  labor, maternal utox positive for amphetamines, and breech presentation. Mother received Mg for neuroprotection and betamethasone x 1. She received ceftriaxone for suspected pyelonephritis. ROM at - with clear/mec/bloody fluid - hours prior to delivery.  Infant emerged vertex/breech, vigorous, with good tone. Delayed cord clamping X   secs, then brought to warmer. Dried, suctioned and stimulated . Apgars  /. Mom wishes to breast/bottle feed. Consents to/Declines hep B vaccine. Consents to/Declines circumcision. Infant admitted to NBN for routine care/NICU for further management of care. Parents updated. Requested by OB to attend this emergent  delivery at ~ 27 weeks for prematurity, breech presentation. Mother is a 37 year old,  , blood type O pos.  Prenatal labs as follow: HIV neg, RPR pending, rubella unknown, HBsA neg, GBS unk, s/p ampicillin x 3. Maternal history significant for chronic HTN and PCOS, in addition to suspected pyelonephritis. Prenatal history significant for lack of prenatal care - mother unaware of pregnancy until presentation. This pregnancy was complicated by  labor, maternal utox positive for amphetamines, and breech presentation. Mother received Mg for neuroprotection and betamethasone x 1. She received ceftriaxone for suspected pyelonephritis. AROM at 17:11 with clear fluid at time of delivery. Foul smell noted upon delivery. Infant emerged breech, depressed, with poor tone. Delayed cord clamping not performed. Immediately brought to warmer and placed in clear bag. Initial HR < 100, PPV provided with HR response. CPAP 6 then provided at 100% FiO2. Intubation attempted x 3 unsuccessfully. CPAP 6/100% continued with stable HR and SpO2 88-95. Apgars 1/4/8. Infant admitted to NICU for further management of care. Parents updated. Requested by OB to attend this emergent  delivery at ~ 27 weeks for prematurity, breech presentation. Mother is a 37 year old,  , blood type O pos.  Prenatal labs as follow: HIV neg, RPR pending, rubella unknown, HBsA neg, GBS unk, s/p ampicillin x 3. Maternal history significant for chronic HTN and PCOS, in addition to suspected pyelonephritis. Prenatal history significant for lack of prenatal care - mother unaware of pregnancy until presentation. This pregnancy was complicated by  labor, maternal utox positive for amphetamines, and breech presentation. Mother received Mg for neuroprotection and betamethasone x 1. She received ceftriaxone for suspected pyelonephritis. AROM at 17:11 with clear fluid at time of delivery. Foul smell noted upon delivery. Infant emerged breech, depressed, with poor tone. Delayed cord clamping not performed. Immediately brought to warmer and placed in clear bag. Initial HR < 100, PPV provided with HR response. CPAP 6 then provided with highest 100% FiO2, then titrated down to 35%. CPAP continued with stable HR and SpO2 88-95. Apgars 1/4/8. Infant admitted to NICU for further management of care. Parents updated.

## 2022-08-12 NOTE — ED PROVIDER NOTE - ATTENDING CONTRIBUTION TO CARE
37yo F with PMHX PCOS, HTN, PUD, p/w 2 weeks of urinary frequency with 1 day of suprapubic pain radiating to lower back b/l and 1 day of vaginal bleeding without clots. Pt is not on OCPs and LMP ~2months ago but menses tend to be irregular per pt.  No dysuria, n/v/d, or fevers    General: Patient alert in no apparent distress  Skin: Dry and intact  HEENT: Head atraumatic. Oral mucosa moist.   Eyes: Conjunctiva normal  Cardiac: Regular rhythm and rate. No pretibial edema b/l  Respiratory: Lungs clear b/l and symmetric. No respiratory distress. Able to speak in complete sentences.  Gastrointestinal: Abdomen soft, nondistended, +tender in suprapubic area   (RN Lynne as chaperone): no CMT or adnexal ttp, scant blood in vaginal vault  Musculoskeletal: Moves all extremities spontaneously  Neurological: alert and oriented to person, place, and time  Psychiatric: Calm and cooperative    a/p  cystitis/uti vs ureteral stone vs fibroids or ovarian pathology less likely  need to eval for pregnancy as well with ucg  labs, UA, Urine cx, transvag US, toradol for pain

## 2022-08-12 NOTE — CONSULT NOTE ADULT - ATTENDING COMMENTS
Patient with advanced cervical dilation in the setting of no prenatal care and patient not aware of her own pregnancy status.    Recommend NO tocoysis since patient presented with vaginal bleeding and there is concern for placental abruption and/or chorioamnionitis in the setting of ACD for unknown period of time.  Cont BMZ and MagSulfate for 12 hours.  In the setting of footling breech fetus reviewed with the patient recommendation for  for delivery and she is in agreement.

## 2022-08-12 NOTE — OB RN INTRAOPERATIVE NOTE - NS_ADDITIONALPERSONNEL_OBGYN_ALL_OB_FT
GIULIANO Roche RN; Alexandra ; From Peds: Fede Isaac MD; Marion Del Toro RN; Agatha Shen, RT; Alexandra Ignacio MD GIULIANO Roche RN ; From Peds: Fede Isaac MD; Marion Del Toro RN; Agatha Shen RT; Alexandra Ignacio MD

## 2022-08-12 NOTE — OB PROVIDER H&P - ASSESSMENT
35 y/o pt  ~27 weeks with known hx of chronic hypertension & PCOS with new positive pregnancy dx presents in  labor with UTI  and suspected pyelonephritis     0505  d/w Dr. Avery   concern for  labor,  bulging membranes in the setting of breech presentation    0515  Dr. Butcher and Dr. Avery at bedside to exam patient   Ultrasound dating pregnancy ~27 weeks, breech presentation  SVE by Dr. Butcher: /-2 with bulging membranes   Plan:  -Admit at ~27 weeks dating estimated by TAS and TVS for primary  for  labor, breech presentation   -Magnesium sulfate for neuro protection  -Procardia for tocolysis   -Ampicillin for unknown GBS/ labor   -Ceftriaxone 1gm for UTI suspected pyelonephritis   -Betamethasone for lung maturity   -1L bolus   -Urine toxicology pending  -Prenatal labs pending   -GC/Chlamydia pending  -Urine culture pending   -Covid 19 pending   -Consents signed and witnessed at bedside

## 2022-08-12 NOTE — CONSULT NOTE ADULT - TIME BILLING
This is a high complexity pt with maternal/fetal findings that increase complexity of medical decision making. The time was spent counseling, educating, reviewing previous notes and tests, coordinating care and documenting. I agreed with the history, physical exam and plan as documented by NP/resident/fellow.

## 2022-08-12 NOTE — OB RN PATIENT PROFILE - FALL HARM RISK - UNIVERSAL INTERVENTIONS
Bed in lowest position, wheels locked, appropriate side rails in place/Call bell, personal items and telephone in reach/Instruct patient to call for assistance before getting out of bed or chair/Non-slip footwear when patient is out of bed/[place] to call system/Physically safe environment - no spills, clutter or unnecessary equipment/Purposeful Proactive Rounding/Room/bathroom lighting operational, light cord in reach

## 2022-08-12 NOTE — OB PROVIDER H&P - NSHPPHYSICALEXAM_GEN_ALL_CORE
Vital Signs Last 24 Hrs  T(C): 36.9 (12 Aug 2022 04:41), Max: 37 (12 Aug 2022 02:06)  T(F): 98.4 (12 Aug 2022 04:41), Max: 98.6 (12 Aug 2022 02:06)  HR: 101 (12 Aug 2022 05:56) (92 - 103)  BP: 121/69 (12 Aug 2022 05:56) (111/74 - 125/73)  BP(mean): 85 (12 Aug 2022 02:06) (85 - 85)  RR: 16 (12 Aug 2022 04:41) (16 - 18)  SpO2: 100% (12 Aug 2022 02:06) (99% - 100%)    Parameters below as of 12 Aug 2022 04:41  Patient On (Oxygen Delivery Method): room air    Abdomen: soft, non tender. no guarding or rebound tenderness  bilateral CVA tenderness   TAS: Anterior placenta, breech presentation, good fetal movement and tone  SSE:  cervix appears dilated ~3cm  bulging membranes     NST in progress

## 2022-08-12 NOTE — ED PROVIDER NOTE - PATIENT PORTAL LINK FT
You can access the FollowMyHealth Patient Portal offered by [facility] by registering at the following website: [email or web address]. By joining [employer]’s FollowMyHealth portal, you will also be able to view your health information using other applications (apps) compatible with our system.

## 2022-08-12 NOTE — CONSULT NOTE PEDS - SUBJECTIVE AND OBJECTIVE BOX
37 y/o pt  with known hx of chronic hypertension and PCOS presents to ER with c/o abdominal pain since yesterday    Ms. Mclaughlin is a 37 y/o  admitted at 27wk gestational age. Maternal history notable for chronic HTN and PCOS and prenatal history notable for no prenatal care as she did not know that she was pregnant. Hep B and COVID sent on admission are negative. Other prenatal labs are sent and pending. Utox is positive for amphetamine.   NICU consulted to discuss what to expect if she were to deliver at 27 weeks gestation.    I met with Ms. Mclaughlin just as she was being rapidly prepared for the OR because examined and found to have made signifcant cervical changes with baby in breech presentation.  We reviewed the following quickly but there was no opportunity for her to ask questions:    1. The NICU team will be present at her delivery and will immediately assess and care for her infant.    2. Due to lung immaturity, the infant will likely require respiratory support. This can be in the form of CPAP or intubation with mechanical ventilation. The outcomes improve after  steroids.    3. Depending on the clinical status of the infant, enteral feedings may not be started immediately. IV nutrition in the form of TPN would be provided via umbilical line or PICC until the infant is able to tolerate full enteral feedings. Due to immature suck/swallow, the infant will require an orogastric or nasogastric tube once feeds are initiated. There will be a slow transition to enteral feedings. The infant is also at risk for hypoglycemia.    4. Discussed the benefits of breastfeeding in  infants and encouraged mother to pump following delivery.    5. Due to prematurity, the infant will be at increased risk for infection and would likely be started on antibiotics after birth. The infant will be screened for infections following delivery and may require other courses of antibiotics during their hospital course if an infection were suspected. If the infant shows signs and symptoms of feeding intolerance, feedings may be held, and the infant may require evaluation for intestinal infection (necrotizing enterocolitis).    6. Risk of symptomatic PDA discussed with possible need for medical vs procedural closure.    7. Risk of intraventricular hemorrhage (IVH) and possible consequences discussed.    8. Retinopathy of prematurity (ROP) risk discussed along with close follow up with ophthalmologist. If ROP is significant, medical or surgical treatment may be required.    9. The infant is at risk for developmental delays as a consequence of prematurity. The infant will be evaluated by a developmental pediatrician to monitor for neurodevelopmental delays.    10. Thermoregulation issues and need to be in an isolette with slow weaning to a crib discussed.    11. Elevated risk of jaundice and possible requirement for phototherapy discussed.    12. Length of stay is highly variable, but given the infant’s gestational age, average stay is approximately 10 weeks. Discussed discharged criteria.    Ms. Mclaughlin did not have the chance to ask any questions and she was told that she will be updated with more information after delivery.    Thank you for the opportunity to participate in the care of this patient.

## 2022-08-12 NOTE — OB PROVIDER DELIVERY SUMMARY - NS_TIMERUPTUREDADMITTED_OBGYN_ALL_OB_FT
----- Message from RONALD Briceño sent at 5/2/2019 12:46 PM PDT -----  Please schedule patient for follow up appointment to discuss lab results. This is not urgent.   RONALD Briceño  
Left detailed message   
Left detailed message   
0 Hour(s) 2 Minute(s)

## 2022-08-12 NOTE — OB PROVIDER H&P - HISTORY OF PRESENT ILLNESS
35 y/o pt  with known hx of chronic hypertension and PCOS presents to ER with c/o abdominal pain since yesterday ~noon and urinary frequency x2 weeks. Serum hcg confirmed positive pregnancy and transvaginal ultrasound performed in ER dating pregnancy to be ~ 27 weeks with SIUP based on femur length. Patient transferred to triage for further evaluation. Pt reports bright red vaginal spotting x2 episodes that was noted on her underwear at 1500. Pt  states irregular menses due to pcos and unaware of positive pregnancy. Pt reports  LMP in January. Pt reports pain in suprapubic region that radiates to lower back. pt denies dysuria.  pt denies n/v/d, fever or chills.     NKDA  PMH:  Chronic Hypertension   PSH: denies  OB: denies  GYN:   PCOS  denies fibroids/stds  Social hx:  denies etoh/smoking/illicit drugs  Medications:  Labetalol 100mg BID

## 2022-08-12 NOTE — ED PROVIDER NOTE - PHYSICAL EXAMINATION
General: Patient alert in no apparent distress  Skin: Dry and intact  HEENT: Head atraumatic. Oral mucosa moist.   Eyes: Conjunctiva normal  Cardiac: Regular rhythm and rate. No pretibial edema b/l  Respiratory: Lungs clear b/l and symmetric. No respiratory distress. Able to speak in complete sentences.  Gastrointestinal: Abdomen soft, nondistended, +tender in suprapubic area   (RN Lynne as chaperone): no CMT or adnexal ttp, scant blood in vaginal vault  Musculoskeletal: Moves all extremities spontaneously  Neurological: alert and oriented to person, place, and time  Psychiatric: Calm and cooperative

## 2022-08-12 NOTE — OB RN DELIVERY SUMMARY - NS_ADDITIONALPERSONNEL_OBGYN_ALL_OB_FT
Margaret Guzmán RN; From Peds: Pollo Paul MD; Alexandra Ignacio MD; Fede Isaac MD; Marion Del Toro RN; RT Mars; Yaima Roche - RN; From Peds: Pollo Paul MD; Alexandra Ignacio MD; Fede Isaac MD; Marion Del Toro RN; RT Mars;

## 2022-08-12 NOTE — ED PROVIDER NOTE - NS ED ROS FT
Constitutional: No weakness or fatigue, no fevers or chills  Skin: No rash or pruritis  HEENT: No sore throat  Cardiovascular: No chest pain, no shortness of breath  Respiratory: No shortness of breath, no cough  Gastrointestinal: No abdominal pain, no nausea, no vomiting, no diarrhea, no constipation  Musculoskeletal: No joint pain  Genitourinary: No dysuria, +urinary frequency, + vaginal bleeding  Neurological: No focal weakness or tingling

## 2022-08-12 NOTE — OB PROVIDER DELIVERY SUMMARY - NSSELHIDDEN_OBGYN_ALL_OB_FT
[NS_DeliveryAttending1_OBGYN_ALL_OB_FT:[id number]],[NS_DeliveryAssist1_OBGYN_ALL_OB_FT:[id number]],[NS_DeliveryRN_OBGYN_ALL_OB_FT:[id number]],[NS_CirculateRN2_OBGYN_ALL_OB_FT:[id number]]

## 2022-08-12 NOTE — ED PROVIDER NOTE - PROGRESS NOTE DETAILS
Dr. Laureano Croft DO (ED ATTENDING):  UCG and BHCG POSITIVE here. pt aware and understands she is pregnant    ultrasound dating pregnancy to 27wks with single live IUP  d/w OBGYN and plan to d/c pt from ED and sent to L+D per out OB guidelines for further fetal monitoring

## 2022-08-12 NOTE — ED PROVIDER NOTE - OBJECTIVE STATEMENT
37yo F with PMHX PCOS, HTN, PUD, p/w 2 weeks of urinary frequency with 1 day of suprapubic pain radiating to lower back b/l and 1 day of vaginal bleeding without clots. Pt is not on OCPs and LMP ~2months ago but menses tend to be irregular per pt.  No dysuria, n/v/d, or fevers

## 2022-08-13 ENCOUNTER — TRANSCRIPTION ENCOUNTER (OUTPATIENT)
Age: 37
End: 2022-08-13

## 2022-08-13 LAB
ALBUMIN SERPL ELPH-MCNC: 3.4 G/DL — SIGNIFICANT CHANGE UP (ref 3.3–5)
ALP SERPL-CCNC: 77 U/L — SIGNIFICANT CHANGE UP (ref 40–120)
ALT FLD-CCNC: 8 U/L — SIGNIFICANT CHANGE UP (ref 4–33)
ANION GAP SERPL CALC-SCNC: 15 MMOL/L — HIGH (ref 7–14)
ANISOCYTOSIS BLD QL: SLIGHT — SIGNIFICANT CHANGE UP
APTT BLD: 25.3 SEC — LOW (ref 27–36.3)
AST SERPL-CCNC: 17 U/L — SIGNIFICANT CHANGE UP (ref 4–32)
BASOPHILS # BLD AUTO: 0 K/UL — SIGNIFICANT CHANGE UP (ref 0–0.2)
BASOPHILS NFR BLD AUTO: 0 % — SIGNIFICANT CHANGE UP (ref 0–2)
BILIRUB SERPL-MCNC: 0.4 MG/DL — SIGNIFICANT CHANGE UP (ref 0.2–1.2)
BUN SERPL-MCNC: 6 MG/DL — LOW (ref 7–23)
C TRACH RRNA SPEC QL NAA+PROBE: SIGNIFICANT CHANGE UP
CALCIUM SERPL-MCNC: 8.5 MG/DL — SIGNIFICANT CHANGE UP (ref 8.4–10.5)
CHLORIDE SERPL-SCNC: 97 MMOL/L — LOW (ref 98–107)
CO2 SERPL-SCNC: 19 MMOL/L — LOW (ref 22–31)
CREAT SERPL-MCNC: 0.65 MG/DL — SIGNIFICANT CHANGE UP (ref 0.5–1.3)
CULTURE RESULTS: SIGNIFICANT CHANGE UP
CULTURE RESULTS: SIGNIFICANT CHANGE UP
EGFR: 116 ML/MIN/1.73M2 — SIGNIFICANT CHANGE UP
EOSINOPHIL # BLD AUTO: 0 K/UL — SIGNIFICANT CHANGE UP (ref 0–0.5)
EOSINOPHIL NFR BLD AUTO: 0 % — SIGNIFICANT CHANGE UP (ref 0–6)
FIBRINOGEN PPP-MCNC: 831 MG/DL — HIGH (ref 330–520)
GIANT PLATELETS BLD QL SMEAR: PRESENT — SIGNIFICANT CHANGE UP
GLUCOSE SERPL-MCNC: 110 MG/DL — HIGH (ref 70–99)
HCT VFR BLD CALC: 27.6 % — LOW (ref 34.5–45)
HGB BLD-MCNC: 8.6 G/DL — LOW (ref 11.5–15.5)
HYPOCHROMIA BLD QL: SLIGHT — SIGNIFICANT CHANGE UP
IANC: 11.54 K/UL — HIGH (ref 1.8–7.4)
INR BLD: 1.21 RATIO — HIGH (ref 0.88–1.16)
LDH SERPL L TO P-CCNC: 165 U/L — SIGNIFICANT CHANGE UP (ref 135–225)
LYMPHOCYTES # BLD AUTO: 1.28 K/UL — SIGNIFICANT CHANGE UP (ref 1–3.3)
LYMPHOCYTES # BLD AUTO: 10 % — LOW (ref 13–44)
MCHC RBC-ENTMCNC: 25.7 PG — LOW (ref 27–34)
MCHC RBC-ENTMCNC: 31.2 GM/DL — LOW (ref 32–36)
MCV RBC AUTO: 82.4 FL — SIGNIFICANT CHANGE UP (ref 80–100)
MICROCYTES BLD QL: SLIGHT — SIGNIFICANT CHANGE UP
MONOCYTES # BLD AUTO: 0.42 K/UL — SIGNIFICANT CHANGE UP (ref 0–0.9)
MONOCYTES NFR BLD AUTO: 3.3 % — SIGNIFICANT CHANGE UP (ref 2–14)
N GONORRHOEA RRNA SPEC QL NAA+PROBE: SIGNIFICANT CHANGE UP
NEUTROPHILS # BLD AUTO: 11.11 K/UL — HIGH (ref 1.8–7.4)
NEUTROPHILS NFR BLD AUTO: 79.2 % — HIGH (ref 43–77)
NEUTS BAND # BLD: 7.5 % — HIGH (ref 0–6)
PLAT MORPH BLD: NORMAL — SIGNIFICANT CHANGE UP
PLATELET # BLD AUTO: 331 K/UL — SIGNIFICANT CHANGE UP (ref 150–400)
PLATELET COUNT - ESTIMATE: NORMAL — SIGNIFICANT CHANGE UP
POIKILOCYTOSIS BLD QL AUTO: SLIGHT — SIGNIFICANT CHANGE UP
POLYCHROMASIA BLD QL SMEAR: SLIGHT — SIGNIFICANT CHANGE UP
POTASSIUM SERPL-MCNC: 4.5 MMOL/L — SIGNIFICANT CHANGE UP (ref 3.5–5.3)
POTASSIUM SERPL-SCNC: 4.5 MMOL/L — SIGNIFICANT CHANGE UP (ref 3.5–5.3)
PROT SERPL-MCNC: 6.8 G/DL — SIGNIFICANT CHANGE UP (ref 6–8.3)
PROTHROM AB SERPL-ACNC: 14.1 SEC — HIGH (ref 10.5–13.4)
RBC # BLD: 3.35 M/UL — LOW (ref 3.8–5.2)
RBC # FLD: 15 % — HIGH (ref 10.3–14.5)
RBC BLD AUTO: ABNORMAL
RUBV IGG SER-ACNC: 12 INDEX — SIGNIFICANT CHANGE UP
RUBV IGG SER-IMP: POSITIVE — SIGNIFICANT CHANGE UP
SODIUM SERPL-SCNC: 131 MMOL/L — LOW (ref 135–145)
SPECIMEN SOURCE: SIGNIFICANT CHANGE UP
SPECIMEN SOURCE: SIGNIFICANT CHANGE UP
T PALLIDUM AB TITR SER: NEGATIVE — SIGNIFICANT CHANGE UP
URATE SERPL-MCNC: 3.7 MG/DL — SIGNIFICANT CHANGE UP (ref 2.5–7)
WBC # BLD: 12.81 K/UL — HIGH (ref 3.8–10.5)
WBC # FLD AUTO: 12.81 K/UL — HIGH (ref 3.8–10.5)

## 2022-08-13 RX ORDER — IBUPROFEN 200 MG
1 TABLET ORAL
Qty: 0 | Refills: 0 | DISCHARGE
Start: 2022-08-13

## 2022-08-13 RX ORDER — ERTAPENEM SODIUM 1 G/1
1000 INJECTION, POWDER, LYOPHILIZED, FOR SOLUTION INTRAMUSCULAR; INTRAVENOUS ONCE
Refills: 0 | Status: COMPLETED | OUTPATIENT
Start: 2022-08-13 | End: 2022-08-13

## 2022-08-13 RX ORDER — ACETAMINOPHEN 500 MG
3 TABLET ORAL
Qty: 0 | Refills: 0 | DISCHARGE
Start: 2022-08-13

## 2022-08-13 RX ORDER — IBUPROFEN 200 MG
600 TABLET ORAL EVERY 6 HOURS
Refills: 0 | Status: DISCONTINUED | OUTPATIENT
Start: 2022-08-13 | End: 2022-08-16

## 2022-08-13 RX ADMIN — HEPARIN SODIUM 5000 UNIT(S): 5000 INJECTION INTRAVENOUS; SUBCUTANEOUS at 22:02

## 2022-08-13 RX ADMIN — Medication 975 MILLIGRAM(S): at 16:05

## 2022-08-13 RX ADMIN — Medication 30 MILLIGRAM(S): at 12:14

## 2022-08-13 RX ADMIN — Medication 975 MILLIGRAM(S): at 22:20

## 2022-08-13 RX ADMIN — Medication 975 MILLIGRAM(S): at 22:03

## 2022-08-13 RX ADMIN — Medication 30 MILLIGRAM(S): at 12:29

## 2022-08-13 RX ADMIN — Medication 100 MILLIGRAM(S): at 05:45

## 2022-08-13 RX ADMIN — Medication 30 MILLIGRAM(S): at 18:45

## 2022-08-13 RX ADMIN — HEPARIN SODIUM 5000 UNIT(S): 5000 INJECTION INTRAVENOUS; SUBCUTANEOUS at 09:31

## 2022-08-13 RX ADMIN — Medication 30 MILLIGRAM(S): at 18:27

## 2022-08-13 RX ADMIN — ERTAPENEM SODIUM 120 MILLIGRAM(S): 1 INJECTION, POWDER, LYOPHILIZED, FOR SOLUTION INTRAMUSCULAR; INTRAVENOUS at 20:00

## 2022-08-13 RX ADMIN — Medication 975 MILLIGRAM(S): at 15:50

## 2022-08-13 NOTE — DISCHARGE NOTE OB - MEDICATION SUMMARY - MEDICATIONS TO CHANGE
I will SWITCH the dose or number of times a day I take the medications listed below when I get home from the hospital:  None I will SWITCH the dose or number of times a day I take the medications listed below when I get home from the hospital:    labetalol 100 mg oral tablet  -- 1 tab(s) by mouth 2 times a day

## 2022-08-13 NOTE — DISCHARGE NOTE OB - PROVIDER TOKENS
FREE:[LAST:[LRC],PHONE:[phone]],FAX:[(   )    -],ADDRESS:[[facility]  [address] [place] [address]]] FREE:[LAST:[[place]],FIRST:[Nathan],PHONE:[phone]],FAX:[(   )    -],ESTABLISHEDPATIENT:[T]]

## 2022-08-13 NOTE — DISCHARGE NOTE OB - FINDINGS/TREATMENT
UA and UCx sent, UA positive for UTI with concern for pyelonephritis, pt given a three-day course of Ceftriaxone. Pt found to be in  labor on admission, and received BMZ/Ampicillin/Magnesium and Procardia. Classical C/S performed due to prematurity and breech presentation. Pt recovering well post-operatively. Urine toxicology positive for amphetamines (8/12), repeat utox negative (8/15). Pt declines drug use.

## 2022-08-13 NOTE — PROGRESS NOTE ADULT - ASSESSMENT
A/P: 35yo POD#1 s/p LTCS.  Patient is stable and doing well post-operatively.    - Continue regular diet.  - Increase ambulation.  - Continue motrin, tylenol, oxycodone PRN for pain control  - Continue invanz  - Labetalol 100 BID  - DTV by 9am  - Dressing removed    Rosy Mcelroy MD  OB/GYN PGY-1 A/P: 37yo POD#1 s/p LTCS.  Patient is stable and doing well post-operatively.    - Continue regular diet.  - Increase ambulation.  - Continue motrin, tylenol, oxycodone PRN for pain control  - s/p invanz  - Labetalol 100 BID  - DTV by 9am  - Dressing removed    Rosy Mcelroy MD  OB/GYN PGY-1

## 2022-08-13 NOTE — DISCHARGE NOTE OB - NS MD DC FALL RISK RISK
For information on Fall & Injury Prevention, visit: [email or web address] OR  [email or web address] OR  https://www.cdc.gov/steadi/patient.html

## 2022-08-13 NOTE — DISCHARGE NOTE OB - COMMUNITY RESOURCE NAME:
Patient instructed to make a follow up appointment at [facility], [phone] for 2-3 weeks from delivery date.

## 2022-08-13 NOTE — DISCHARGE NOTE OB - MEDICATION SUMMARY - MEDICATIONS TO TAKE
I will START or STAY ON the medications listed below when I get home from the hospital:    acetaminophen 325 mg oral tablet  -- 3 tab(s) by mouth every 8 hours  -- Indication: For Pain    ibuprofen 600 mg oral tablet  -- 1 tab(s) by mouth every 6 hours  -- Indication: For Pain    labetalol 100 mg oral tablet  -- 1 tab(s) by mouth 2 times a day  -- Indication: For Chronic hypertension   I will START or STAY ON the medications listed below when I get home from the hospital:    acetaminophen 325 mg oral tablet  -- 3 tab(s) by mouth every 8 hours  -- Indication: For Pain    ibuprofen 600 mg oral tablet  -- 1 tab(s) by mouth every 6 hours  -- Indication: For Pain    ibuprofen 600 mg oral tablet  -- 1 tab(s) by mouth every 6 hours  -- Indication: For Pain    labetalol 200 mg oral tablet  -- 1 tab(s) by mouth every 12 hours  -- Indication: For Chronic hypertension

## 2022-08-13 NOTE — DISCHARGE NOTE OB - CARE PLAN
Principal Discharge DX:	Delivery by classical  section  Assessment and plan of treatment:	Pt recovering well post-operatively.   1

## 2022-08-13 NOTE — DISCHARGE NOTE OB - CARE PROVIDER_API CALL
[facility],   [facility]  [address] [place] [address]  Phone: [phone]  Fax: (   )    -  Follow Up Time:    Nathan Esteban  Phone: [phone]  Fax: (   )    -  Established Patient  Follow Up Time:

## 2022-08-13 NOTE — PROGRESS NOTE ADULT - SUBJECTIVE AND OBJECTIVE BOX
OB Progress Note:  Delivery, POD#1    S: 35yo POD#1 s/p LTCS. Her pain is well controlled. She is tolerating a regular diet. Not passing flatus. Has not voided yet. Denies N/V. Denies CP/SOB/lightheadedness/dizziness. Pt denies sxs of PEC: denies headache, visual changes, RUQ pain, respiratory distress  She is ambulating without difficulty.     O:   Vital Signs Last 24 Hrs  T(C): 36.5 (13 Aug 2022 05:48), Max: 37.2 (12 Aug 2022 14:01)  T(F): 97.7 (13 Aug 2022 05:48), Max: 98.96 (12 Aug 2022 14:01)  HR: 100 (13 Aug 2022 05:48) (78 - 150)  BP: 112/69 (13 Aug 2022 05:48) (86/74 - 139/57)  BP(mean): 79 (12 Aug 2022 21:00) (59 - 79)  RR: 19 (13 Aug 2022 05:48) (14 - 26)  SpO2: 100% (13 Aug 2022 05:48) (96% - 100%)    Parameters below as of 13 Aug 2022 05:48  Patient On (Oxygen Delivery Method): room air        Labs:  Blood type: O Positive  Rubella IgG: RPR: Negative                          8.6<L>   12.81<H> >-----------< 331    (  @ 07:11 )             27.6<L>                        9.3<L>   16.82<H> >-----------< 323    (  @ 00:40 )             28.9<L>    22 @ 07:11      x   |  x   |  6<L>  ----------------------------<  110<H>  x    |  19<L>  |  0.65    22 @ 00:40      135  |  101  |  5<L>  ----------------------------<  86  3.4<L>   |  19<L>  |  0.60        Ca    8.5      13 Aug 2022 07:11  Ca    9.3      12 Aug 2022 00:40  Phos  2.9     -  Mg     4.00<H>     08-12  Mg     1.70     -    TPro  6.8  /  Alb  3.4  /  TBili  0.4  /  DBili  x   /  AST  17  /  ALT  8   /  AlkPhos  77  22 @ 07:11          PE:  General: NAD  Abdomen: Mildly distended, appropriately tender, incision c/d/i.  Extremities: No erythema, no pitting edema

## 2022-08-13 NOTE — DISCHARGE NOTE OB - HOSPITAL COURSE
Hospital Course:  On Patient underwent an uncomplicated emergent primary classical  section due to  labor at 27w, with breech presentation and cervical dilation to 8cm. On admission pt received BMZ/Ampicillin/Magnesium and Procardia due to prematurity. Pt received a dose of Invanz post-delivery due to concern for fetal odor.    Upon admission, pt also had urinary symptoms consistent with UTI and suspected pyelonephritis. UA was positive for large leukocyte esterase and nitrites, with elevated WBC and RBC. Urine toxicology was positive for amphetamines. Pt received a three-day course of ceftriaxone. PMH significant for chronic HTN, pt continued home Labetalol 100mg BID. HELLP labs WNL.    QBL: 432  Hct: 28.9    Patient's post operative course was unremarkable and she remained hemodynamically stable and afebrile throughout. Upon discharge on POD3, the patient is ambulating and voiding spontaneously, tolerated oral intake, pain was well controlled with oral medications and vital signs were stable. Social work consult was completed as pt did not know she was pregnant prior to admission. Hospital Course:  On Patient underwent an uncomplicated emergent primary classical  section due to  labor at 27w, with breech presentation and cervical dilation to 8cm. On admission pt received BMZ/Ampicillin/Magnesium and Procardia due to prematurity. Pt received a dose of Invanz post-delivery due to concern for fetal odor.    Upon admission, pt also had urinary symptoms consistent with UTI and suspected pyelonephritis. UA was positive for large leukocyte esterase and nitrites, with elevated WBC and RBC. Urine toxicology was positive for amphetamines. Pt received a three-day course of ceftriaxone. PMH significant for chronic HTN, pt continued home Labetalol 100mg BID. HELLP labs WNL.    QBL: 432  Hct: 28.9    Patient's post operative course was unremarkable and she remained hemodynamically stable and afebrile throughout. Upon discharge on POD3, the patient is ambulating and voiding spontaneously, tolerated oral intake, pain was well controlled with oral medications and vital signs were stable. Social work consult was completed as pt did not know she was pregnant prior to admission (therefore had no prenatal care). Hospital Course:  On Patient underwent an uncomplicated emergent primary classical  section due to  labor at 27w, with breech presentation and cervical dilation to 8cm. On admission pt received BMZ/Ampicillin/Magnesium and Procardia due to prematurity. Pt received a dose of Invanz post-delivery due to concern for fetal odor.    Upon admission, pt also had urinary symptoms consistent with UTI and suspected pyelonephritis. UA was positive for large leukocyte esterase and nitrites, with elevated WBC and RBC. UCx grew normal urogenital power. Of note, urine toxicology was positive for amphetamines (), repeat UTox on 8/15 was negative. Pt declined recreational drug use. Pt received a course of ceftriaxone. PMH significant for chronic HTN, pt continued home Labetalol 200mg BID. HELLP labs WNL.    QBL: 432  Hct: 28.9->27.6    Patient's post operative course was unremarkable and she remained hemodynamically stable and afebrile throughout. Upon discharge on POD4, the patient is ambulating and voiding spontaneously, tolerated oral intake, pain was well controlled with oral medications and vital signs were stable. Social work consult was completed as pt did not know she was pregnant prior to admission (therefore had no prenatal care). Infant was not cleared for circumcision inpatient due to prematurity. Pt was counseled on postpartum birth control options and received a Depo-Provera injection prior to discharge.

## 2022-08-13 NOTE — DISCHARGE NOTE OB - PATIENT PORTAL LINK FT
You can access the FollowMyHealth Patient Portal offered by [facility] by registering at the following website: [email or web address]. By joining [employer]’s FollowMyHealth portal, you will also be able to view your health information using other applications (apps) compatible with our system.

## 2022-08-14 RX ORDER — TETANUS TOXOID, REDUCED DIPHTHERIA TOXOID AND ACELLULAR PERTUSSIS VACCINE, ADSORBED 5; 2.5; 8; 8; 2.5 [IU]/.5ML; [IU]/.5ML; UG/.5ML; UG/.5ML; UG/.5ML
0.5 SUSPENSION INTRAMUSCULAR ONCE
Refills: 0 | Status: COMPLETED | OUTPATIENT
Start: 2022-08-14 | End: 2022-08-14

## 2022-08-14 RX ORDER — MEDROXYPROGESTERONE ACETATE 150 MG/ML
150 INJECTION, SUSPENSION, EXTENDED RELEASE INTRAMUSCULAR ONCE
Refills: 0 | Status: COMPLETED | OUTPATIENT
Start: 2022-08-14 | End: 2022-08-14

## 2022-08-14 RX ADMIN — HEPARIN SODIUM 5000 UNIT(S): 5000 INJECTION INTRAVENOUS; SUBCUTANEOUS at 09:17

## 2022-08-14 RX ADMIN — MEDROXYPROGESTERONE ACETATE 150 MILLIGRAM(S): 150 INJECTION, SUSPENSION, EXTENDED RELEASE INTRAMUSCULAR at 11:04

## 2022-08-14 RX ADMIN — Medication 600 MILLIGRAM(S): at 06:07

## 2022-08-14 RX ADMIN — Medication 600 MILLIGRAM(S): at 13:01

## 2022-08-14 RX ADMIN — SIMETHICONE 80 MILLIGRAM(S): 80 TABLET, CHEWABLE ORAL at 21:45

## 2022-08-14 RX ADMIN — Medication 975 MILLIGRAM(S): at 10:16

## 2022-08-14 RX ADMIN — Medication 600 MILLIGRAM(S): at 00:43

## 2022-08-14 RX ADMIN — Medication 975 MILLIGRAM(S): at 21:44

## 2022-08-14 RX ADMIN — Medication 600 MILLIGRAM(S): at 00:06

## 2022-08-14 RX ADMIN — Medication 100 MILLIGRAM(S): at 19:50

## 2022-08-14 RX ADMIN — TETANUS TOXOID, REDUCED DIPHTHERIA TOXOID AND ACELLULAR PERTUSSIS VACCINE, ADSORBED 0.5 MILLILITER(S): 5; 2.5; 8; 8; 2.5 SUSPENSION INTRAMUSCULAR at 12:15

## 2022-08-14 RX ADMIN — Medication 600 MILLIGRAM(S): at 12:14

## 2022-08-14 RX ADMIN — Medication 975 MILLIGRAM(S): at 04:08

## 2022-08-14 RX ADMIN — Medication 975 MILLIGRAM(S): at 22:11

## 2022-08-14 RX ADMIN — Medication 975 MILLIGRAM(S): at 09:17

## 2022-08-14 RX ADMIN — Medication 975 MILLIGRAM(S): at 05:00

## 2022-08-14 RX ADMIN — HEPARIN SODIUM 5000 UNIT(S): 5000 INJECTION INTRAVENOUS; SUBCUTANEOUS at 21:45

## 2022-08-14 NOTE — PROGRESS NOTE ADULT - SUBJECTIVE AND OBJECTIVE BOX
R1 Progress Note    Patient seen and examined at bedside, no acute overnight events. No acute complaints, pain well controlled. Patient is ambulating and tolerating regular diet. Voiding spontaneously, has not yet passed flatus. Denies CP, SOB, N/V, HA, blurred vision, epigastric pain.    Vital Signs Last 24 Hours  T(C): 36.8 (08-14-22 @ 06:02), Max: 37.1 (08-13-22 @ 10:07)  HR: 93 (08-14-22 @ 06:02) (92 - 117)  BP: 95/60 (08-14-22 @ 06:02) (95/60 - 125/76)  RR: 18 (08-14-22 @ 06:02) (17 - 18)  SpO2: 100% (08-14-22 @ 06:02) (100% - 100%)    I&O's Summary    12 Aug 2022 07:01  -  13 Aug 2022 07:00  --------------------------------------------------------  IN: 3600 mL / OUT: 4052 mL / NET: -452 mL    13 Aug 2022 07:01  -  14 Aug 2022 06:54  --------------------------------------------------------  IN: 0 mL / OUT: 400 mL / NET: -400 mL        Physical Exam:  General: NAD  Abdomen: Soft, non-tender, non-distended, fundus firm  Incision: Pfannenstiel incision CDI, subcuticular suture closure  Pelvic: Lochia wnl    Labs:    Blood Type: O Positive  Antibody Screen: --  RPR: Negative               8.6    12.81 )-----------( 331      ( 08-13 @ 07:11 )             27.6                9.3    16.82 )-----------( 323      ( 08-12 @ 00:40 )             28.9         MEDICATIONS  (STANDING):  acetaminophen     Tablet .. 975 milliGRAM(s) Oral <User Schedule>  diphtheria/tetanus/pertussis (acellular) Vaccine (ADAcel) 0.5 milliLiter(s) IntraMuscular once  heparin   Injectable 5000 Unit(s) SubCutaneous every 12 hours  ibuprofen  Tablet. 600 milliGRAM(s) Oral every 6 hours  labetalol 100 milliGRAM(s) Oral two times a day  lactated ringers. 1000 milliLiter(s) (125 mL/Hr) IV Continuous <Continuous>  magnesium sulfate Infusion 2 Gm/Hr (50 mL/Hr) IV Continuous <Continuous>  morphine PF Spinal 0.1 milliGRAM(s) IntraThecal. once    MEDICATIONS  (PRN):  dexAMETHasone  Injectable 4 milliGRAM(s) IV Push every 6 hours PRN Nausea  diphenhydrAMINE 25 milliGRAM(s) Oral every 6 hours PRN Pruritus  lanolin Ointment 1 Application(s) Topical every 6 hours PRN Sore Nipples  magnesium hydroxide Suspension 30 milliLiter(s) Oral two times a day PRN Constipation  naloxone Injectable 0.1 milliGRAM(s) IV Push every 3 minutes PRN For ANY of the following changes in patient status:  A. Breaths Per Minute LESS THAN 10, B. Oxygen saturation LESS THAN 90%, C. Sedation score of 6 for Stop After: 4 Times  ondansetron Injectable 4 milliGRAM(s) IV Push every 6 hours PRN Nausea  oxyCODONE    IR 5 milliGRAM(s) Oral every 3 hours PRN Moderate to Severe Pain (4-10)  oxyCODONE    IR 5 milliGRAM(s) Oral once PRN Moderate to Severe Pain (4-10)  simethicone 80 milliGRAM(s) Chew every 4 hours PRN Gas   R1 Progress Note    Patient seen and examined at bedside, no acute overnight events. No acute complaints, pain well controlled. Patient is ambulating and tolerating regular diet. Voiding spontaneously, has not yet passed flatus. Denies CP, SOB, N/V, HA, blurred vision, epigastric pain.    Vital Signs Last 24 Hours  T(C): 36.8 (08-14-22 @ 06:02), Max: 37.1 (08-13-22 @ 10:07)  HR: 93 (08-14-22 @ 06:02) (92 - 117)  BP: 95/60 (08-14-22 @ 06:02) (95/60 - 125/76)  RR: 18 (08-14-22 @ 06:02) (17 - 18)  SpO2: 100% (08-14-22 @ 06:02) (100% - 100%)    I&O's Summary    12 Aug 2022 07:01  -  13 Aug 2022 07:00  --------------------------------------------------------  IN: 3600 mL / OUT: 4052 mL / NET: -452 mL    13 Aug 2022 07:01  -  14 Aug 2022 06:54  --------------------------------------------------------  IN: 0 mL / OUT: 400 mL / NET: -400 mL        Physical Exam:  General: NAD  Abdomen: Soft, non-tender, non-distended, fundus firm, +BS  Incision: Pfannenstiel incision CDI, subcuticular suture closure  Pelvic: Lochia wnl    Labs:    Blood Type: O Positive  Antibody Screen: --  RPR: Negative               8.6    12.81 )-----------( 331      ( 08-13 @ 07:11 )             27.6                9.3    16.82 )-----------( 323      ( 08-12 @ 00:40 )             28.9       UCx 8/12 - >=3organisms probable collection contamination  UCx 8/12 - <10,000 cfu/ml normal urogenital power      MEDICATIONS  (STANDING):  acetaminophen     Tablet .. 975 milliGRAM(s) Oral <User Schedule>  diphtheria/tetanus/pertussis (acellular) Vaccine (ADAcel) 0.5 milliLiter(s) IntraMuscular once  heparin   Injectable 5000 Unit(s) SubCutaneous every 12 hours  ibuprofen  Tablet. 600 milliGRAM(s) Oral every 6 hours  labetalol 100 milliGRAM(s) Oral two times a day  lactated ringers. 1000 milliLiter(s) (125 mL/Hr) IV Continuous <Continuous>  magnesium sulfate Infusion 2 Gm/Hr (50 mL/Hr) IV Continuous <Continuous>  morphine PF Spinal 0.1 milliGRAM(s) IntraThecal. once    MEDICATIONS  (PRN):  dexAMETHasone  Injectable 4 milliGRAM(s) IV Push every 6 hours PRN Nausea  diphenhydrAMINE 25 milliGRAM(s) Oral every 6 hours PRN Pruritus  lanolin Ointment 1 Application(s) Topical every 6 hours PRN Sore Nipples  magnesium hydroxide Suspension 30 milliLiter(s) Oral two times a day PRN Constipation  naloxone Injectable 0.1 milliGRAM(s) IV Push every 3 minutes PRN For ANY of the following changes in patient status:  A. Breaths Per Minute LESS THAN 10, B. Oxygen saturation LESS THAN 90%, C. Sedation score of 6 for Stop After: 4 Times  ondansetron Injectable 4 milliGRAM(s) IV Push every 6 hours PRN Nausea  oxyCODONE    IR 5 milliGRAM(s) Oral every 3 hours PRN Moderate to Severe Pain (4-10)  oxyCODONE    IR 5 milliGRAM(s) Oral once PRN Moderate to Severe Pain (4-10)  simethicone 80 milliGRAM(s) Chew every 4 hours PRN Gas

## 2022-08-14 NOTE — PROGRESS NOTE ADULT - ASSESSMENT
A/P: 37yo  POD#2 s/p pCCS. H/H 8.6/27.6. Patient is stable and doing well post-operatively.      #UTI/suspected pyelo  - UA with large leuks and +nitrites  - S/p ceftriaxone  - Utox positive for amphetamines  - F/u UCx ()    #cHTN  - Continue Labetalol 100mg BID  - HELLP labs WNL on admission  - BPs overnight well controlled    #Postoperative state  - Continue regular diet.  - Increase ambulation.  - Continue motrin, tylenol, oxycodone PRN for pain control  - s/p invanz for fetal odor during delivery  - SW consult because pt was uanware she was pregnant, did not receive PNC    Marilu Pires PGY1 A/P: 35yo  POD#2 s/p pCCS. H/H 8.6/27.6. Patient is stable and doing well post-operatively.      #UTI/suspected pyelo  - UA with large leuks and +nitrites  - S/p ceftriaxone  - Utox positive for amphetamines  - UCx contaminated    #cHTN  - Continue Labetalol 100mg BID  - HELLP labs WNL on admission  - BPs overnight well controlled    #Postoperative state  - Continue regular diet.  - Increase ambulation.  - Continue motrin, tylenol, oxycodone PRN for pain control  - s/p invanz for fetal odor during delivery  - SW consult because pt was uanware she was pregnant, did not receive PNC    Marilu Pires PGY1

## 2022-08-14 NOTE — ED POST DISCHARGE NOTE - RESULT SUMMARY
culture grew 3 or more types of organisms  which indicate collection contamination, consider recollection only if clinically indicated. No antibiotic listed in ED provider note or prescription writer at time of discharge. Patient was pregnant. Patient contact # [phone] discussed with patient ucx results and that patient needs repeat UCX with MD Discussed with patient need to return to ED if symptoms don't continue to improve or recur or develops any new or worsening symptoms that are of concern.

## 2022-08-15 LAB — PCP SPEC-MCNC: SIGNIFICANT CHANGE UP

## 2022-08-15 RX ORDER — LABETALOL HCL 100 MG
200 TABLET ORAL EVERY 12 HOURS
Refills: 0 | Status: DISCONTINUED | OUTPATIENT
Start: 2022-08-15 | End: 2022-08-16

## 2022-08-15 RX ORDER — LABETALOL HCL 100 MG
1 TABLET ORAL
Qty: 0 | Refills: 0 | DISCHARGE

## 2022-08-15 RX ORDER — LABETALOL HCL 100 MG
1 TABLET ORAL
Qty: 0 | Refills: 0 | DISCHARGE
Start: 2022-08-15

## 2022-08-15 RX ORDER — IBUPROFEN 200 MG
1 TABLET ORAL
Qty: 0 | Refills: 0 | DISCHARGE
Start: 2022-08-15

## 2022-08-15 RX ADMIN — Medication 600 MILLIGRAM(S): at 06:19

## 2022-08-15 RX ADMIN — Medication 600 MILLIGRAM(S): at 19:40

## 2022-08-15 RX ADMIN — Medication 600 MILLIGRAM(S): at 12:32

## 2022-08-15 RX ADMIN — Medication 600 MILLIGRAM(S): at 18:00

## 2022-08-15 RX ADMIN — Medication 200 MILLIGRAM(S): at 18:01

## 2022-08-15 RX ADMIN — Medication 100 MILLIGRAM(S): at 06:00

## 2022-08-15 RX ADMIN — HEPARIN SODIUM 5000 UNIT(S): 5000 INJECTION INTRAVENOUS; SUBCUTANEOUS at 21:32

## 2022-08-15 RX ADMIN — Medication 975 MILLIGRAM(S): at 10:43

## 2022-08-15 RX ADMIN — Medication 975 MILLIGRAM(S): at 22:30

## 2022-08-15 RX ADMIN — HEPARIN SODIUM 5000 UNIT(S): 5000 INJECTION INTRAVENOUS; SUBCUTANEOUS at 09:59

## 2022-08-15 RX ADMIN — Medication 975 MILLIGRAM(S): at 10:00

## 2022-08-15 RX ADMIN — Medication 600 MILLIGRAM(S): at 06:01

## 2022-08-15 RX ADMIN — Medication 975 MILLIGRAM(S): at 21:32

## 2022-08-15 RX ADMIN — Medication 600 MILLIGRAM(S): at 13:25

## 2022-08-15 NOTE — PROGRESS NOTE ADULT - SUBJECTIVE AND OBJECTIVE BOX
R1 Progress Note    Patient seen and examined at bedside, no acute overnight events. No acute complaints, pain well controlled. Patient is ambulating and tolerating regular diet. Voiding spontaneously and passing flatus. Denies CP, SOB, N/V, HA, blurred vision, epigastric pain.    Vital Signs Last 24 Hours  T(C): 36.7 (08-15-22 @ 05:43), Max: 37.3 (08-14-22 @ 21:54)  HR: 79 (08-15-22 @ 05:43) (79 - 88)  BP: 114/75 (08-15-22 @ 05:43) (112/74 - 128/79)  RR: 18 (08-15-22 @ 05:43) (17 - 18)  SpO2: 99% (08-15-22 @ 05:43) (98% - 100%)    I&O's Summary      Physical Exam:  General: NAD  Abdomen: Soft, non-tender, non-distended, fundus firm  Incision: Pfannenstiel incision CDI, subcuticular suture closure  Pelvic: Lochia wnl    Labs:    Blood Type: O Positive  Antibody Screen: --  RPR: Negative               8.6    12.81 )-----------( 331      ( 08-13 @ 07:11 )             27.6                9.3    16.82 )-----------( 323      ( 08-12 @ 00:40 )             28.9         MEDICATIONS  (STANDING):  acetaminophen     Tablet .. 975 milliGRAM(s) Oral <User Schedule>  heparin   Injectable 5000 Unit(s) SubCutaneous every 12 hours  ibuprofen  Tablet. 600 milliGRAM(s) Oral every 6 hours  labetalol 100 milliGRAM(s) Oral two times a day  lactated ringers. 1000 milliLiter(s) (125 mL/Hr) IV Continuous <Continuous>  magnesium sulfate Infusion 2 Gm/Hr (50 mL/Hr) IV Continuous <Continuous>  morphine PF Spinal 0.1 milliGRAM(s) IntraThecal. once    MEDICATIONS  (PRN):  dexAMETHasone  Injectable 4 milliGRAM(s) IV Push every 6 hours PRN Nausea  diphenhydrAMINE 25 milliGRAM(s) Oral every 6 hours PRN Pruritus  lanolin Ointment 1 Application(s) Topical every 6 hours PRN Sore Nipples  magnesium hydroxide Suspension 30 milliLiter(s) Oral two times a day PRN Constipation  naloxone Injectable 0.1 milliGRAM(s) IV Push every 3 minutes PRN For ANY of the following changes in patient status:  A. Breaths Per Minute LESS THAN 10, B. Oxygen saturation LESS THAN 90%, C. Sedation score of 6 for Stop After: 4 Times  ondansetron Injectable 4 milliGRAM(s) IV Push every 6 hours PRN Nausea  oxyCODONE    IR 5 milliGRAM(s) Oral every 3 hours PRN Moderate to Severe Pain (4-10)  oxyCODONE    IR 5 milliGRAM(s) Oral once PRN Moderate to Severe Pain (4-10)  simethicone 80 milliGRAM(s) Chew every 4 hours PRN Gas

## 2022-08-15 NOTE — PROGRESS NOTE ADULT - ASSESSMENT
35y/o  POD#3 from [facility] for PTL and breech presentation at 27w complicated by UTI/suspected pyelo. PMH significant for cHTN and PCOS. H/H 8.6/27.6. Overall pt recovering well post-operatively.    #UTI/suspected pyelo  - UA with large leuks and +nitrites  - S/p ceftriaxone  - Utox positive for amphetamines (likely 2/2 labetalol)  - UCx (): normal urogenital power  - Pt reports dysuria has improved    #cHTN  - Continue Labetalol 100mg BID  - HELLP labs WNL on admission  - BPs overnight well controlled, 110-120s/70s    #Postoperative state  - Continue regular diet.  - Increase ambulation.  - Continue motrin, tylenol, oxycodone PRN for pain control  - s/p invanz for fetal odor during delivery  - SW consult because pt was uanware she was pregnant, did not receive PNC (likely due to hx of PCOS)    Marilu Pires, PGY1

## 2022-08-16 VITALS
SYSTOLIC BLOOD PRESSURE: 128 MMHG | OXYGEN SATURATION: 100 % | TEMPERATURE: 98 F | RESPIRATION RATE: 12 BRPM | DIASTOLIC BLOOD PRESSURE: 85 MMHG | HEART RATE: 76 BPM

## 2022-08-16 RX ADMIN — Medication 200 MILLIGRAM(S): at 06:11

## 2022-08-16 RX ADMIN — Medication 600 MILLIGRAM(S): at 12:14

## 2022-08-16 RX ADMIN — Medication 975 MILLIGRAM(S): at 06:11

## 2022-08-16 RX ADMIN — Medication 975 MILLIGRAM(S): at 16:44

## 2022-08-16 RX ADMIN — Medication 975 MILLIGRAM(S): at 06:44

## 2022-08-16 RX ADMIN — HEPARIN SODIUM 5000 UNIT(S): 5000 INJECTION INTRAVENOUS; SUBCUTANEOUS at 10:57

## 2022-08-16 RX ADMIN — Medication 600 MILLIGRAM(S): at 12:35

## 2022-08-16 NOTE — PROGRESS NOTE ADULT - ASSESSMENT
37y/o  POD#4 from [facility] for PTL and breech presentation at 27w complicated by UTI/suspected pyelo. PMH significant for cHTN and PCOS. H/H 8.6/27.6. Overall pt recovering well post-operatively.    #UTI/suspected pyelo  - UA with large leuks and +nitrites  - S/p ceftriaxone  - UCx (): normal urogenital power  - Pt reports dysuria has improved    #cHTN  - Continue Labetalol 200mg BID  - HELLP labs WNL on admission  - BPs overnight well controlled, 120s/70s    #Urine toxicology  - Pt verbally consented for urine tox x2  - Utox positive for amphetamines (), negative (8/15)  - Pt declines recreational drug use    #Postoperative state  - Continue regular diet.  - Increase ambulation.  - Continue motrin, tylenol, oxycodone PRN for pain control  - s/p invanz for fetal odor during delivery  - SW consulted because pt was uanware she was pregnant, did not receive PNC (likely due to hx of PCOS)    Marilu Pires, PGY1

## 2022-08-16 NOTE — PROGRESS NOTE ADULT - ATTENDING COMMENTS
Associate Chief of L & D ( late entry)     Spoke with patient who is at the NICU seeing her baby    OB Progress Note:  Delivery, POD#4    S: 37yo POD#4 s/p Classical CS .  Patient reports that she is feeling better    O:   Vital Signs Last 24 Hrs  T(C): 36.8 (16 Aug 2022 13:31), Max: 37 (15 Aug 2022 21:35)  T(F): 98.2 (16 Aug 2022 13:31), Max: 98.6 (15 Aug 2022 21:35)  HR: 76 (16 Aug 2022 13:) (72 - 82)  BP: 128/85 (16 Aug 2022 13:) (117/82 - 128/85)  RR: 12 (16 Aug 2022 13:) (12 - 18)  SpO2: 100% (16 Aug 2022 13:) (100% - 100%)        Labs:  Blood type: O Positive  Rubella IgG: RPR: Negative                          8.6<L>   12.81<H> >-----------< 331    (  @ 07:11 )             27.6<L>    - @ 07:11      131<L>  |  97<L>  |  6<L>  ----------------------------<  110<H>  4.5   |  19<L>  |  0.65      PE:    deferred     A/P: 37yo POD#4 s/p Classical CS with CHTN, PTL at 27 weeks gestation malpresentation.     - Continue regular diet.  - Increase ambulation.  - Continue Motrin, Tylenol, oxycodone PRN for pain control.  vs. continue PCEA for pain.  - Monitor BP's closely  - Continue Labetalol 100mg q 12 hours   - repeat urine tox  negative  - Called over to the NICU answered all of her questions  - Yennifer Barclay M.D., M.B.A., M.S.
POD#1 s/p C/s at 27wks  Feels well  Advised inc ambulations and hydration
Associate Chief of L & D ( late entry)     I have met this patient for the first time today.  She was admitted by Dr. Butcher with CHTN, dysuria, PTL at 27 weeks gestation malprestenation and was delivered by Dr. Mina and Nikolai Lu    OB Progress Note:  Delivery, POD#3    S: 35yo POD#3 s/p Classical CS .  Patient reports that she is feeling better    O:   Vital Signs Last 24 Hrs  T(C): 37 (15 Aug 2022 09:48), Max: 37.3 (14 Aug 2022 21:54)  T(F): 98.6 (15 Aug 2022 09:48), Max: 99.1 (14 Aug 2022 21:54)  HR: 87 (15 Aug 2022 09:48) (79 - 88)  BP: 133/77 (15 Aug 2022 09:48) (112/74 - 133/77)  RR: 18 (15 Aug 2022 09:48) (17 - 18)  SpO2: 98% (15 Aug 2022 09:48) (98% - 100%)    Parameters below as of 15 Aug 2022 09:48  Patient On (Oxygen Delivery Method): room air      Labs:  Blood type: O Positive  Rubella IgG: RPR: Negative                          8.6<L>   12.81<H> >-----------< 331    (  @ 07:11 )             27.6<L>    -22 @ 07:11      131<L>  |  97<L>  |  6<L>  ----------------------------<  110<H>  4.5   |  19<L>  |  0.65      PE:    Abdomen: soft, fundus firm, Mildly distended, tenderness  incision c/d/i.  Extremities: No erythema, no pitting edema    A/P: 35yo POD#3 s/p Classical CS with CHTN, PTL at 27 weeks gestation malpresentation. Resolved UTI    - Continue regular diet.  - Increase ambulation.  - Continue Motrin, Tylenol, oxycodone PRN for pain control.  vs. continue PCEA for pain.  - Monitor BP's closely  - Continue Labetalol 100mg q 12 hours   - Patient has requested a repeat urine tox  - Obtain records from her PCP in the community      Rylee De Four CORINE Morrell., M.B.A., M.S.
Patient evaluated at bedside, out of bed and walking around. Says she feels well, pain well controlled and she is tolerating PO and voiding, no flatus yet. VSS, BP 95/60, HR 93. Abdomen soft, appropriately tender, non-distended, no rebound/guarding, incision c/d/i. She is POD2 s/p primary classical c/s at 27wks for PTL, breech. Patient has cHTN which has been well controlled with Labetalol 100mg BID though dose was held this morning for low BP. Patient has no toxic symptoms. Continue to monitor BPs though consider discontinuing Labetalol if BPs persistently within normal range. Positive Amphetamines on Utox could be false positive due to the Labetalol. SW consult. Patient to receive depot provera for contraception pior to discharge then plans to have an IUD for postpartum contraception.    MD Chris   Attending

## 2022-08-16 NOTE — PROGRESS NOTE ADULT - SUBJECTIVE AND OBJECTIVE BOX
R1 Progress Note    Patient seen and examined at bedside, no acute overnight events. No acute complaints, pain well controlled. Patient is ambulating and tolerating regular diet. Voiding spontaneously and passing flatus. Denies CP, SOB, N/V, HA, blurred vision, epigastric pain.    Vital Signs Last 24 Hours  T(C): 36.7 (08-16-22 @ 06:02), Max: 37 (08-15-22 @ 09:48)  HR: 82 (08-16-22 @ 06:02) (72 - 87)  BP: 117/82 (08-16-22 @ 06:02) (117/82 - 133/77)  RR: 17 (08-16-22 @ 06:02) (17 - 18)  SpO2: 100% (08-16-22 @ 06:02) (98% - 100%)    I&O's Summary      Physical Exam:  General: NAD  Abdomen: Soft, non-tender, non-distended, fundus firm  Incision: Pfannenstiel incision CDI, subcuticular suture closure  Pelvic: Lochia wnl    Labs:    Blood Type: O Positive  Antibody Screen: --  RPR: Negative               8.6    12.81 )-----------( 331      ( 08-13 @ 07:11 )             27.6                9.3    16.82 )-----------( 323      ( 08-12 @ 00:40 )             28.9         MEDICATIONS  (STANDING):  acetaminophen     Tablet .. 975 milliGRAM(s) Oral <User Schedule>  heparin   Injectable 5000 Unit(s) SubCutaneous every 12 hours  ibuprofen  Tablet. 600 milliGRAM(s) Oral every 6 hours  labetalol 200 milliGRAM(s) Oral every 12 hours  lactated ringers. 1000 milliLiter(s) (125 mL/Hr) IV Continuous <Continuous>  magnesium sulfate Infusion 2 Gm/Hr (50 mL/Hr) IV Continuous <Continuous>  morphine PF Spinal 0.1 milliGRAM(s) IntraThecal. once    MEDICATIONS  (PRN):  dexAMETHasone  Injectable 4 milliGRAM(s) IV Push every 6 hours PRN Nausea  diphenhydrAMINE 25 milliGRAM(s) Oral every 6 hours PRN Pruritus  lanolin Ointment 1 Application(s) Topical every 6 hours PRN Sore Nipples  magnesium hydroxide Suspension 30 milliLiter(s) Oral two times a day PRN Constipation  naloxone Injectable 0.1 milliGRAM(s) IV Push every 3 minutes PRN For ANY of the following changes in patient status:  A. Breaths Per Minute LESS THAN 10, B. Oxygen saturation LESS THAN 90%, C. Sedation score of 6 for Stop After: 4 Times  ondansetron Injectable 4 milliGRAM(s) IV Push every 6 hours PRN Nausea  oxyCODONE    IR 5 milliGRAM(s) Oral every 3 hours PRN Moderate to Severe Pain (4-10)  oxyCODONE    IR 5 milliGRAM(s) Oral once PRN Moderate to Severe Pain (4-10)  simethicone 80 milliGRAM(s) Chew every 4 hours PRN Gas

## 2022-08-17 ENCOUNTER — NON-APPOINTMENT (OUTPATIENT)
Age: 37
End: 2022-08-17

## 2022-08-17 DIAGNOSIS — E28.2 POLYCYSTIC OVARIAN SYNDROME: ICD-10-CM

## 2022-08-17 PROBLEM — I10 ESSENTIAL (PRIMARY) HYPERTENSION: Chronic | Status: ACTIVE | Noted: 2022-08-12

## 2022-08-17 RX ORDER — LABETALOL HYDROCHLORIDE 200 MG/1
200 TABLET, FILM COATED ORAL
Refills: 0 | Status: ACTIVE | COMMUNITY
Start: 2022-08-17

## 2022-08-17 RX ORDER — PRENATAL VIT NO.126/IRON/FOLIC 28MG-0.8MG
28-0.8 TABLET ORAL DAILY
Refills: 0 | Status: ACTIVE | COMMUNITY
Start: 2022-08-17

## 2022-08-18 ENCOUNTER — TRANSCRIPTION ENCOUNTER (OUTPATIENT)
Age: 37
End: 2022-08-18

## 2022-08-18 ENCOUNTER — OUTPATIENT (OUTPATIENT)
Dept: OUTPATIENT SERVICES | Facility: HOSPITAL | Age: 37
LOS: 1 days | End: 2022-08-18

## 2022-08-18 ENCOUNTER — RESULT REVIEW (OUTPATIENT)
Age: 37
End: 2022-08-18

## 2022-08-18 ENCOUNTER — APPOINTMENT (OUTPATIENT)
Dept: OBGYN | Facility: HOSPITAL | Age: 37
End: 2022-08-18

## 2022-08-18 VITALS
DIASTOLIC BLOOD PRESSURE: 75 MMHG | TEMPERATURE: 97.9 F | WEIGHT: 208 LBS | HEIGHT: 62 IN | BODY MASS INDEX: 38.28 KG/M2 | SYSTOLIC BLOOD PRESSURE: 122 MMHG | HEART RATE: 88 BPM

## 2022-08-18 DIAGNOSIS — Z83.511 FAMILY HISTORY OF GLAUCOMA: ICD-10-CM

## 2022-08-18 DIAGNOSIS — Z98.891 HISTORY OF UTERINE SCAR FROM PREVIOUS SURGERY: ICD-10-CM

## 2022-08-18 LAB
BASOPHILS # BLD AUTO: 0.01 K/UL — SIGNIFICANT CHANGE UP (ref 0–0.2)
BASOPHILS NFR BLD AUTO: 0.1 % — SIGNIFICANT CHANGE UP (ref 0–2)
EOSINOPHIL # BLD AUTO: 0.14 K/UL — SIGNIFICANT CHANGE UP (ref 0–0.5)
EOSINOPHIL NFR BLD AUTO: 1.3 % — SIGNIFICANT CHANGE UP (ref 0–6)
HCT VFR BLD CALC: 26.2 % — LOW (ref 34.5–45)
HGB BLD-MCNC: 8.4 G/DL — LOW (ref 11.5–15.5)
IANC: 7.96 K/UL — HIGH (ref 1.8–7.4)
IMM GRANULOCYTES NFR BLD AUTO: 2.5 % — HIGH (ref 0–1.5)
LYMPHOCYTES # BLD AUTO: 19.3 % — SIGNIFICANT CHANGE UP (ref 13–44)
LYMPHOCYTES # BLD AUTO: 2.15 K/UL — SIGNIFICANT CHANGE UP (ref 1–3.3)
MCHC RBC-ENTMCNC: 25.4 PG — LOW (ref 27–34)
MCHC RBC-ENTMCNC: 32.1 GM/DL — SIGNIFICANT CHANGE UP (ref 32–36)
MCV RBC AUTO: 79.2 FL — LOW (ref 80–100)
MONOCYTES # BLD AUTO: 0.62 K/UL — SIGNIFICANT CHANGE UP (ref 0–0.9)
MONOCYTES NFR BLD AUTO: 5.6 % — SIGNIFICANT CHANGE UP (ref 2–14)
NEUTROPHILS # BLD AUTO: 7.96 K/UL — HIGH (ref 1.8–7.4)
NEUTROPHILS NFR BLD AUTO: 71.2 % — SIGNIFICANT CHANGE UP (ref 43–77)
NRBC # BLD: 0 /100 WBCS — SIGNIFICANT CHANGE UP (ref 0–0)
NRBC # FLD: 0 K/UL — SIGNIFICANT CHANGE UP (ref 0–0)
PLATELET # BLD AUTO: 405 K/UL — HIGH (ref 150–400)
RBC # BLD: 3.31 M/UL — LOW (ref 3.8–5.2)
RBC # FLD: 15.1 % — HIGH (ref 10.3–14.5)
WBC # BLD: 11.16 K/UL — HIGH (ref 3.8–10.5)
WBC # FLD AUTO: 11.16 K/UL — HIGH (ref 3.8–10.5)

## 2022-08-18 NOTE — HISTORY OF PRESENT ILLNESS
[Postpartum Follow Up] : postpartum follow up [Complications:___] : complications include: [unfilled] [PTL/PROM] : PTL/PROM [GA Diag ___wks] : [unfilled] weeks GA at diagnosis [GA Delivery ___wks] : [unfilled] weeks GA at delivery [BW ___grams] : [unfilled]Ugrams birth weight [Delivery Date: ___] : on [unfilled] [Primary C/S] : delivered by  section [Male] : Delivery History: baby boy [Wt. ___] : weighing [unfilled] [NICU: ___] : NICU: [unfilled] [Discharge HCT: ___] : hematocrit level was [unfilled] [Discharge HGB: ___] : hemoglobin level was [unfilled] [Clean/Dry/Intact] : clean, dry and intact [PROM Preceded by CTX] : PROM not preceded by CTX [Rhogam] : Rhogam was not administered [Rubella Vaccine] : Rubella vaccine was not administered [Pertussis Vaccine] : Pertussis vaccine was not administered [BTL] : no tubal ligation [Resumed Menses] : has not resumed her menses [Resumed Gardner] : has not resumed intercourse [FreeTextEntry8] : Patient presented to ED with abdominal pain.  She thought it was due to a UTI.  Patient was found to have +HCG and was 27 weeks pregnant.  Patient states she had no idea that she was pregnant.  Hx of PCOS and not unusual to skip menses months at a time.  Patient was found to be 8 cm dilated and breech position.  Primary classical C/S performed secondary to category II tracing.  Babys apgars 1/4/8. He is currently in the NICU and patient states he is doing well.  she reports being happy to be a mother and has good support between the FOB and her family. [de-identified] : Patient is feeling well overall.  Has some dysuria.  Blood pressure 122/75.  Currently taking Labetalol 200 mg BID [de-identified] : PNV's while pumping/breastfeeding; bottle of PNV's dispensed.  CBC today.  Urine culture today.  SW in to see patient.  RTC 4-5 weeks or PRN.  Patient to schedule appt. with OB cardiology.

## 2022-08-19 ENCOUNTER — NON-APPOINTMENT (OUTPATIENT)
Age: 37
End: 2022-08-19

## 2022-08-19 ENCOUNTER — TRANSCRIPTION ENCOUNTER (OUTPATIENT)
Age: 37
End: 2022-08-19

## 2022-08-19 DIAGNOSIS — Z98.891 HISTORY OF UTERINE SCAR FROM PREVIOUS SURGERY: ICD-10-CM

## 2022-08-19 DIAGNOSIS — O99.019 ANEMIA COMPLICATING PREGNANCY, UNSPECIFIED TRIMESTER: ICD-10-CM

## 2022-08-19 DIAGNOSIS — D64.9 ANEMIA, UNSPECIFIED: ICD-10-CM

## 2022-08-19 RX ORDER — CHLORHEXIDINE GLUCONATE 4 %
325 (65 FE) LIQUID (ML) TOPICAL DAILY
Qty: 30 | Refills: 2 | Status: ACTIVE | COMMUNITY
Start: 2022-08-19 | End: 1900-01-01

## 2022-08-20 LAB
CULTURE RESULTS: SIGNIFICANT CHANGE UP
SPECIMEN SOURCE: SIGNIFICANT CHANGE UP

## 2022-08-21 ENCOUNTER — NON-APPOINTMENT (OUTPATIENT)
Age: 37
End: 2022-08-21

## 2022-08-22 ENCOUNTER — TRANSCRIPTION ENCOUNTER (OUTPATIENT)
Age: 37
End: 2022-08-22

## 2022-08-26 ENCOUNTER — TRANSCRIPTION ENCOUNTER (OUTPATIENT)
Age: 37
End: 2022-08-26

## 2022-08-29 DIAGNOSIS — N39.0 URINARY TRACT INFECTION, SITE NOT SPECIFIED: ICD-10-CM

## 2022-08-29 RX ORDER — AMOXICILLIN 500 MG/1
500 CAPSULE ORAL 3 TIMES DAILY
Qty: 21 | Refills: 0 | Status: ACTIVE | COMMUNITY
Start: 2022-08-29 | End: 1900-01-01

## 2022-08-30 ENCOUNTER — NON-APPOINTMENT (OUTPATIENT)
Age: 37
End: 2022-08-30

## 2022-08-30 NOTE — HISTORY OF PRESENT ILLNESS
[FreeTextEntry1] : Ms. NIRAV KOEHLER 36 year old F is here Sep 6th, 2022 to establish care in the [facility].\par

## 2022-09-06 ENCOUNTER — APPOINTMENT (OUTPATIENT)
Dept: CARDIOLOGY | Facility: CLINIC | Age: 37
End: 2022-09-06

## 2022-09-06 ENCOUNTER — TRANSCRIPTION ENCOUNTER (OUTPATIENT)
Age: 37
End: 2022-09-06

## 2022-09-07 ENCOUNTER — NON-APPOINTMENT (OUTPATIENT)
Age: 37
End: 2022-09-07

## 2022-09-13 ENCOUNTER — NON-APPOINTMENT (OUTPATIENT)
Age: 37
End: 2022-09-13

## 2022-09-22 ENCOUNTER — RESULT REVIEW (OUTPATIENT)
Age: 37
End: 2022-09-22

## 2022-09-22 ENCOUNTER — APPOINTMENT (OUTPATIENT)
Dept: OBGYN | Facility: HOSPITAL | Age: 37
End: 2022-09-22

## 2022-09-22 ENCOUNTER — OUTPATIENT (OUTPATIENT)
Dept: OUTPATIENT SERVICES | Facility: HOSPITAL | Age: 37
LOS: 1 days | End: 2022-09-22

## 2022-09-22 VITALS
HEIGHT: 62 IN | SYSTOLIC BLOOD PRESSURE: 124 MMHG | HEART RATE: 74 BPM | WEIGHT: 198 LBS | BODY MASS INDEX: 36.44 KG/M2 | DIASTOLIC BLOOD PRESSURE: 81 MMHG | TEMPERATURE: 97.9 F

## 2022-09-22 DIAGNOSIS — Z51.89 ENCOUNTER FOR OTHER SPECIFIED AFTERCARE: ICD-10-CM

## 2022-09-22 NOTE — HISTORY OF PRESENT ILLNESS
[Male] : Delivery History: baby boy [Wt. ___] : weighing [unfilled] [NICU: ___] : NICU: [unfilled] [Discharge HCT: ___] : hematocrit level was [unfilled] [Discharge HGB: ___] : hemoglobin level was [unfilled] [None] : No associated symptoms are reported [Last Pap Date: ___] : Last Pap Date: [unfilled] [Clean/Dry/Intact] : clean, dry and intact [Back to Normal] : is back to normal in size [Mild] : mild vaginal bleeding [Delivery Date: ___] : on [unfilled] [Rubella Vaccine] : Rubella vaccine was not administered [Pertussis Vaccine] : Pertussis vaccine was not administered [BTL] : no tubal ligation [Erythema] : not erythematous [Swelling] : not swollen [Dehiscence] : not dehisced [Examination Of The Breasts] : breasts are normal [FreeTextEntry8] : I am here for a check up [de-identified] : pt went to ER for abdominal pain found to be pregnant.  and in PTL 8cm dilated 27 wks classical c section CHTN [de-identified] : s/p classical c section  27 wks with PTL  CHTN on labetolol 200 mg po qd. got depoprovera PP wants IUD. haD NEG          pap 2 yrs ago no records. pt treated for uti finished meds [de-identified] : continue labetolol. cardiology appt scheduled for tomorrow . pt cancelled it . told to reschedule. pap gc chlamydia today today. RTC beginning 11/2022 IUD. urine culture  today

## 2022-09-23 ENCOUNTER — APPOINTMENT (OUTPATIENT)
Dept: CARDIOLOGY | Facility: CLINIC | Age: 37
End: 2022-09-23

## 2022-09-23 LAB
CULTURE RESULTS: SIGNIFICANT CHANGE UP
HPV HIGH+LOW RISK DNA PNL CVX: SIGNIFICANT CHANGE UP
SPECIMEN SOURCE: SIGNIFICANT CHANGE UP
SURGICAL PATHOLOGY STUDY: SIGNIFICANT CHANGE UP

## 2022-09-24 LAB
C TRACH RRNA SPEC QL NAA+PROBE: SIGNIFICANT CHANGE UP
N GONORRHOEA RRNA SPEC QL NAA+PROBE: SIGNIFICANT CHANGE UP
SPECIMEN SOURCE: SIGNIFICANT CHANGE UP

## 2022-09-27 LAB — CYTOLOGY SPEC DOC CYTO: SIGNIFICANT CHANGE UP

## 2022-10-06 ENCOUNTER — TRANSCRIPTION ENCOUNTER (OUTPATIENT)
Age: 37
End: 2022-10-06

## 2022-11-01 ENCOUNTER — APPOINTMENT (OUTPATIENT)
Dept: OBGYN | Facility: HOSPITAL | Age: 37
End: 2022-11-01

## 2022-11-12 NOTE — OB RN PATIENT PROFILE - NS PRO PT RIGHT SUPPORT PERSON
.Pt discharged at this time. Discharge instructions and medications reviewed,  Questions were answered. PT verbalized understanding. VSS, Afebrile. Follow up appointments were discussed.          Christopher Burton, [facility]  11/12/22 [id number] Declines

## 2022-11-18 ENCOUNTER — APPOINTMENT (OUTPATIENT)
Dept: OBGYN | Facility: HOSPITAL | Age: 37
End: 2022-11-18

## 2022-11-18 ENCOUNTER — OUTPATIENT (OUTPATIENT)
Dept: OUTPATIENT SERVICES | Facility: HOSPITAL | Age: 37
LOS: 1 days | End: 2022-11-18

## 2022-11-18 VITALS
DIASTOLIC BLOOD PRESSURE: 84 MMHG | SYSTOLIC BLOOD PRESSURE: 157 MMHG | HEIGHT: 62 IN | BODY MASS INDEX: 39.2 KG/M2 | WEIGHT: 213 LBS | HEART RATE: 79 BPM | TEMPERATURE: 97.2 F

## 2022-11-18 DIAGNOSIS — Z30.42 ENCOUNTER FOR SURVEILLANCE OF INJECTABLE CONTRACEPTIVE: ICD-10-CM

## 2022-11-18 DIAGNOSIS — I10 ESSENTIAL (PRIMARY) HYPERTENSION: ICD-10-CM

## 2022-11-18 DIAGNOSIS — Z00.00 ENCOUNTER FOR GENERAL ADULT MEDICAL EXAMINATION W/OUT ABNORMAL FINDINGS: ICD-10-CM

## 2022-11-18 LAB
HCG UR QL: NEGATIVE
QUALITY CONTROL: YES

## 2022-11-18 RX ORDER — MEDROXYPROGESTERONE ACETATE 150 MG/ML
150 INJECTION, SUSPENSION INTRAMUSCULAR
Qty: 0 | Refills: 0 | Status: COMPLETED | OUTPATIENT
Start: 2022-11-18

## 2022-11-18 RX ORDER — LABETALOL HYDROCHLORIDE 200 MG/1
200 TABLET, FILM COATED ORAL
Qty: 60 | Refills: 3 | Status: ACTIVE | COMMUNITY
Start: 2022-11-18 | End: 1900-01-01

## 2022-11-18 RX ADMIN — MEDROXYPROGESTERONE ACETATE 0 MG/ML: 150 INJECTION, SUSPENSION INTRAMUSCULAR at 00:00

## 2022-11-18 NOTE — HISTORY OF PRESENT ILLNESS
[Patient reported PAP Smear was normal] : Patient reported PAP Smear was normal [N] : Patient reports normal menses [Y] : Patient uses contraception [DepoProvera] : uses depo-medroxyprogesterone [TextBox_4] : 35 yo female here for follow up on birth control. Had Depo Provera after birth of john.  Now very unsure what she wants.  Has hx chronic hypertension and BP today 157/90.  States she takes labetalol once a day but is supposed to take it twice daily according to notes.  Never made follow up appt with cardiology. [PapSmeardate] : 09/22 [GonorrheaDate] : 09/22 [ChlamydiaDate] : 09/22

## 2022-11-18 NOTE — DISCUSSION/SUMMARY
[FreeTextEntry1] : Chronic hypertension\par --stressed importance of taking labetalol twice daily\par --home BP monitoring\par --will task cardio OB for appt\par \par Family planning\par --birth control options reviewed and patient still very unsure\par --agrees to take Depo Provera injection again today\par --literature regarding Nexplanon and IUD given\par Follow up 2/3-2/17/2023 for next Depo or other birth control\par \par \par

## 2022-11-18 NOTE — COUNSELING
[Nutrition/ Exercise/ Weight Management] : nutrition, exercise, weight management [Contraception/ Emergency Contraception/ Safe Sexual Practices] : contraception, emergency contraception, safe sexual practices [STD (testing, results, tx)] : STD (testing, results, tx) [Lab Results] : lab results [Medication Management] : medication management

## 2022-11-22 DIAGNOSIS — I10 ESSENTIAL (PRIMARY) HYPERTENSION: ICD-10-CM

## 2022-11-22 DIAGNOSIS — Z00.00 ENCOUNTER FOR GENERAL ADULT MEDICAL EXAMINATION WITHOUT ABNORMAL FINDINGS: ICD-10-CM

## 2022-11-22 DIAGNOSIS — Z30.42 ENCOUNTER FOR SURVEILLANCE OF INJECTABLE CONTRACEPTIVE: ICD-10-CM

## 2022-12-15 ENCOUNTER — APPOINTMENT (OUTPATIENT)
Dept: CARDIOLOGY | Facility: CLINIC | Age: 37
End: 2022-12-15

## 2023-02-10 ENCOUNTER — APPOINTMENT (OUTPATIENT)
Dept: OBGYN | Facility: HOSPITAL | Age: 38
End: 2023-02-10

## 2023-04-18 ENCOUNTER — RX RENEWAL (OUTPATIENT)
Age: 38
End: 2023-04-18